# Patient Record
Sex: FEMALE | Race: WHITE | NOT HISPANIC OR LATINO | URBAN - METROPOLITAN AREA
[De-identification: names, ages, dates, MRNs, and addresses within clinical notes are randomized per-mention and may not be internally consistent; named-entity substitution may affect disease eponyms.]

---

## 2017-01-09 ENCOUNTER — HOME CARE/HOSPICE - HEALTHEAST (OUTPATIENT)
Dept: HOME HEALTH SERVICES | Facility: HOME HEALTH | Age: 77
End: 2017-01-09

## 2017-01-12 ENCOUNTER — COMMUNICATION - HEALTHEAST (OUTPATIENT)
Dept: HOME HEALTH SERVICES | Facility: HOME HEALTH | Age: 77
End: 2017-01-12

## 2017-01-12 ENCOUNTER — HOME CARE/HOSPICE - HEALTHEAST (OUTPATIENT)
Dept: HOME HEALTH SERVICES | Facility: HOME HEALTH | Age: 77
End: 2017-01-12

## 2017-01-12 ENCOUNTER — COMMUNICATION - HEALTHEAST (OUTPATIENT)
Dept: FAMILY MEDICINE | Facility: CLINIC | Age: 77
End: 2017-01-12

## 2017-01-23 ENCOUNTER — COMMUNICATION - HEALTHEAST (OUTPATIENT)
Dept: FAMILY MEDICINE | Facility: CLINIC | Age: 77
End: 2017-01-23

## 2017-01-23 ENCOUNTER — HOME CARE/HOSPICE - HEALTHEAST (OUTPATIENT)
Dept: HOME HEALTH SERVICES | Facility: HOME HEALTH | Age: 77
End: 2017-01-23

## 2017-01-23 DIAGNOSIS — E11.9 DM2 (DIABETES MELLITUS, TYPE 2) (H): ICD-10-CM

## 2017-02-01 ENCOUNTER — RECORDS - HEALTHEAST (OUTPATIENT)
Dept: ADMINISTRATIVE | Facility: OTHER | Age: 77
End: 2017-02-01

## 2017-02-06 ENCOUNTER — HOME CARE/HOSPICE - HEALTHEAST (OUTPATIENT)
Dept: HOME HEALTH SERVICES | Facility: HOME HEALTH | Age: 77
End: 2017-02-06

## 2017-02-14 ENCOUNTER — RECORDS - HEALTHEAST (OUTPATIENT)
Dept: ADMINISTRATIVE | Facility: OTHER | Age: 77
End: 2017-02-14

## 2017-02-20 ENCOUNTER — HOME CARE/HOSPICE - HEALTHEAST (OUTPATIENT)
Dept: HOME HEALTH SERVICES | Facility: HOME HEALTH | Age: 77
End: 2017-02-20

## 2017-03-06 ENCOUNTER — HOME CARE/HOSPICE - HEALTHEAST (OUTPATIENT)
Dept: HOME HEALTH SERVICES | Facility: HOME HEALTH | Age: 77
End: 2017-03-06

## 2017-03-06 ENCOUNTER — COMMUNICATION - HEALTHEAST (OUTPATIENT)
Dept: FAMILY MEDICINE | Facility: CLINIC | Age: 77
End: 2017-03-06

## 2017-03-06 DIAGNOSIS — I10 ESSENTIAL HYPERTENSION, MALIGNANT: ICD-10-CM

## 2017-03-09 ENCOUNTER — COMMUNICATION - HEALTHEAST (OUTPATIENT)
Dept: SCHEDULING | Facility: CLINIC | Age: 77
End: 2017-03-09

## 2017-03-09 DIAGNOSIS — E78.00 HYPERCHOLESTEREMIA: ICD-10-CM

## 2017-03-13 ENCOUNTER — COMMUNICATION - HEALTHEAST (OUTPATIENT)
Dept: HOME HEALTH SERVICES | Facility: HOME HEALTH | Age: 77
End: 2017-03-13

## 2017-03-13 ENCOUNTER — HOME CARE/HOSPICE - HEALTHEAST (OUTPATIENT)
Dept: HOME HEALTH SERVICES | Facility: HOME HEALTH | Age: 77
End: 2017-03-13

## 2017-03-20 ENCOUNTER — HOME CARE/HOSPICE - HEALTHEAST (OUTPATIENT)
Dept: HOME HEALTH SERVICES | Facility: HOME HEALTH | Age: 77
End: 2017-03-20

## 2017-03-20 ENCOUNTER — COMMUNICATION - HEALTHEAST (OUTPATIENT)
Dept: FAMILY MEDICINE | Facility: CLINIC | Age: 77
End: 2017-03-20

## 2017-03-20 DIAGNOSIS — E11.9 TYPE II DIABETES MELLITUS (H): ICD-10-CM

## 2017-03-20 DIAGNOSIS — Z00.00 HEALTHCARE MAINTENANCE: ICD-10-CM

## 2017-03-22 ENCOUNTER — OFFICE VISIT - HEALTHEAST (OUTPATIENT)
Dept: FAMILY MEDICINE | Facility: CLINIC | Age: 77
End: 2017-03-22

## 2017-03-22 DIAGNOSIS — N18.30 CHRONIC KIDNEY DISEASE, STAGE III (MODERATE) (H): ICD-10-CM

## 2017-03-22 DIAGNOSIS — E11.9 TYPE II DIABETES MELLITUS (H): ICD-10-CM

## 2017-03-22 DIAGNOSIS — M10.9 GOUT: ICD-10-CM

## 2017-03-22 DIAGNOSIS — F09 COGNITIVE DISORDER: ICD-10-CM

## 2017-03-22 DIAGNOSIS — M47.9 OSTEOARTHRITIS OF SPINE: ICD-10-CM

## 2017-03-22 ASSESSMENT — MIFFLIN-ST. JEOR: SCORE: 1576.98

## 2017-04-01 ENCOUNTER — HOME CARE/HOSPICE - HEALTHEAST (OUTPATIENT)
Dept: HOME HEALTH SERVICES | Facility: HOME HEALTH | Age: 77
End: 2017-04-01

## 2017-04-03 ENCOUNTER — HOME CARE/HOSPICE - HEALTHEAST (OUTPATIENT)
Dept: HOME HEALTH SERVICES | Facility: HOME HEALTH | Age: 77
End: 2017-04-03

## 2017-04-05 ENCOUNTER — COMMUNICATION - HEALTHEAST (OUTPATIENT)
Dept: FAMILY MEDICINE | Facility: CLINIC | Age: 77
End: 2017-04-05

## 2017-04-05 DIAGNOSIS — E11.9 TYPE II DIABETES MELLITUS (H): ICD-10-CM

## 2017-04-05 DIAGNOSIS — M10.9 GOUT: ICD-10-CM

## 2017-04-10 ENCOUNTER — COMMUNICATION - HEALTHEAST (OUTPATIENT)
Dept: SCHEDULING | Facility: CLINIC | Age: 77
End: 2017-04-10

## 2017-04-18 ENCOUNTER — COMMUNICATION - HEALTHEAST (OUTPATIENT)
Dept: SCHEDULING | Facility: CLINIC | Age: 77
End: 2017-04-18

## 2017-04-18 ENCOUNTER — COMMUNICATION - HEALTHEAST (OUTPATIENT)
Dept: FAMILY MEDICINE | Facility: CLINIC | Age: 77
End: 2017-04-18

## 2017-04-24 ENCOUNTER — COMMUNICATION - HEALTHEAST (OUTPATIENT)
Dept: FAMILY MEDICINE | Facility: CLINIC | Age: 77
End: 2017-04-24

## 2017-04-25 ENCOUNTER — COMMUNICATION - HEALTHEAST (OUTPATIENT)
Dept: FAMILY MEDICINE | Facility: CLINIC | Age: 77
End: 2017-04-25

## 2017-04-25 DIAGNOSIS — I10 ESSENTIAL HYPERTENSION, MALIGNANT: ICD-10-CM

## 2017-04-26 ENCOUNTER — OFFICE VISIT - HEALTHEAST (OUTPATIENT)
Dept: FAMILY MEDICINE | Facility: CLINIC | Age: 77
End: 2017-04-26

## 2017-04-26 DIAGNOSIS — N18.30 CHRONIC KIDNEY DISEASE, STAGE III (MODERATE) (H): ICD-10-CM

## 2017-04-26 DIAGNOSIS — R60.0 BILATERAL LEG EDEMA: ICD-10-CM

## 2017-04-26 DIAGNOSIS — J40 BRONCHITIS: ICD-10-CM

## 2017-04-26 ASSESSMENT — MIFFLIN-ST. JEOR: SCORE: 1445.44

## 2017-05-08 ENCOUNTER — COMMUNICATION - HEALTHEAST (OUTPATIENT)
Dept: SCHEDULING | Facility: CLINIC | Age: 77
End: 2017-05-08

## 2017-05-15 ENCOUNTER — OFFICE VISIT - HEALTHEAST (OUTPATIENT)
Dept: FAMILY MEDICINE | Facility: CLINIC | Age: 77
End: 2017-05-15

## 2017-05-15 ENCOUNTER — COMMUNICATION - HEALTHEAST (OUTPATIENT)
Dept: SCHEDULING | Facility: CLINIC | Age: 77
End: 2017-05-15

## 2017-05-15 DIAGNOSIS — I48.91 ATRIAL FIBRILLATION (H): ICD-10-CM

## 2017-05-15 DIAGNOSIS — E11.9 TYPE II DIABETES MELLITUS (H): ICD-10-CM

## 2017-05-15 DIAGNOSIS — R00.1 BRADYCARDIA: ICD-10-CM

## 2017-05-15 LAB
ATRIAL RATE - MUSE: 77 BPM
DIASTOLIC BLOOD PRESSURE - MUSE: NORMAL MMHG
HBA1C MFR BLD: 5.8 % (ref 3.5–6)
INTERPRETATION ECG - MUSE: NORMAL
P AXIS - MUSE: NORMAL DEGREES
PR INTERVAL - MUSE: NORMAL MS
QRS DURATION - MUSE: 128 MS
QT - MUSE: 530 MS
QTC - MUSE: 502 MS
R AXIS - MUSE: 12 DEGREES
SYSTOLIC BLOOD PRESSURE - MUSE: NORMAL MMHG
T AXIS - MUSE: -11 DEGREES
VENTRICULAR RATE- MUSE: 54 BPM

## 2017-05-15 ASSESSMENT — MIFFLIN-ST. JEOR: SCORE: 1485.69

## 2017-05-18 ENCOUNTER — HOME CARE/HOSPICE - HEALTHEAST (OUTPATIENT)
Dept: HOME HEALTH SERVICES | Facility: HOME HEALTH | Age: 77
End: 2017-05-18

## 2017-05-19 ENCOUNTER — OFFICE VISIT - HEALTHEAST (OUTPATIENT)
Dept: OCCUPATIONAL THERAPY | Facility: CLINIC | Age: 77
End: 2017-05-19

## 2017-05-19 DIAGNOSIS — R69 DIAGNOSIS UNKNOWN: ICD-10-CM

## 2017-05-22 ENCOUNTER — COMMUNICATION - HEALTHEAST (OUTPATIENT)
Dept: FAMILY MEDICINE | Facility: CLINIC | Age: 77
End: 2017-05-22

## 2017-05-22 ENCOUNTER — OFFICE VISIT - HEALTHEAST (OUTPATIENT)
Dept: GERIATRICS | Facility: CLINIC | Age: 77
End: 2017-05-22

## 2017-05-22 DIAGNOSIS — N18.9 ACUTE ON CHRONIC KIDNEY FAILURE (H): ICD-10-CM

## 2017-05-22 DIAGNOSIS — E11.9 DM2 (DIABETES MELLITUS, TYPE 2) (H): ICD-10-CM

## 2017-05-22 DIAGNOSIS — R60.0 LOWER EXTREMITY EDEMA: ICD-10-CM

## 2017-05-22 DIAGNOSIS — M10.9 GOUT: ICD-10-CM

## 2017-05-22 DIAGNOSIS — E66.9 OBESITY: ICD-10-CM

## 2017-05-22 DIAGNOSIS — E83.42 HYPOMAGNESEMIA: ICD-10-CM

## 2017-05-22 DIAGNOSIS — N17.9 ACUTE ON CHRONIC KIDNEY FAILURE (H): ICD-10-CM

## 2017-05-23 ENCOUNTER — RECORDS - HEALTHEAST (OUTPATIENT)
Dept: ADMINISTRATIVE | Facility: OTHER | Age: 77
End: 2017-05-23

## 2017-05-26 ENCOUNTER — OFFICE VISIT - HEALTHEAST (OUTPATIENT)
Dept: GERIATRICS | Facility: CLINIC | Age: 77
End: 2017-05-26

## 2017-05-26 DIAGNOSIS — I10 ESSENTIAL HYPERTENSION WITH GOAL BLOOD PRESSURE LESS THAN 140/90: ICD-10-CM

## 2017-05-26 DIAGNOSIS — N18.9 ACUTE ON CHRONIC KIDNEY FAILURE (H): ICD-10-CM

## 2017-05-26 DIAGNOSIS — E11.9 DM2 (DIABETES MELLITUS, TYPE 2) (H): ICD-10-CM

## 2017-05-26 DIAGNOSIS — N17.9 ACUTE ON CHRONIC KIDNEY FAILURE (H): ICD-10-CM

## 2017-06-01 ENCOUNTER — OFFICE VISIT - HEALTHEAST (OUTPATIENT)
Dept: GERIATRICS | Facility: CLINIC | Age: 77
End: 2017-06-01

## 2017-06-01 DIAGNOSIS — N18.9 ACUTE ON CHRONIC KIDNEY FAILURE (H): ICD-10-CM

## 2017-06-01 DIAGNOSIS — K59.00 CONSTIPATION: ICD-10-CM

## 2017-06-01 DIAGNOSIS — N17.9 ACUTE ON CHRONIC KIDNEY FAILURE (H): ICD-10-CM

## 2017-06-01 DIAGNOSIS — E11.9 DM2 (DIABETES MELLITUS, TYPE 2) (H): ICD-10-CM

## 2017-06-01 DIAGNOSIS — E83.42 HYPOMAGNESEMIA: ICD-10-CM

## 2017-06-05 ENCOUNTER — OFFICE VISIT - HEALTHEAST (OUTPATIENT)
Dept: GERIATRICS | Facility: CLINIC | Age: 77
End: 2017-06-05

## 2017-06-05 DIAGNOSIS — N17.9 ACUTE ON CHRONIC KIDNEY FAILURE (H): ICD-10-CM

## 2017-06-05 DIAGNOSIS — N18.9 ACUTE ON CHRONIC KIDNEY FAILURE (H): ICD-10-CM

## 2017-06-05 DIAGNOSIS — I10 ESSENTIAL HYPERTENSION WITH GOAL BLOOD PRESSURE LESS THAN 140/90: ICD-10-CM

## 2017-06-05 DIAGNOSIS — E83.42 HYPOMAGNESEMIA: ICD-10-CM

## 2017-06-05 DIAGNOSIS — R26.81 GAIT INSTABILITY: ICD-10-CM

## 2017-06-07 ENCOUNTER — OFFICE VISIT - HEALTHEAST (OUTPATIENT)
Dept: GERIATRICS | Facility: CLINIC | Age: 77
End: 2017-06-07

## 2017-06-07 DIAGNOSIS — N17.9 ACUTE ON CHRONIC KIDNEY FAILURE (H): ICD-10-CM

## 2017-06-07 DIAGNOSIS — N18.9 ACUTE ON CHRONIC KIDNEY FAILURE (H): ICD-10-CM

## 2017-06-07 DIAGNOSIS — I10 ESSENTIAL HYPERTENSION WITH GOAL BLOOD PRESSURE LESS THAN 140/90: ICD-10-CM

## 2017-06-07 DIAGNOSIS — R26.81 GAIT INSTABILITY: ICD-10-CM

## 2017-06-12 ENCOUNTER — OFFICE VISIT - HEALTHEAST (OUTPATIENT)
Dept: GERIATRICS | Facility: CLINIC | Age: 77
End: 2017-06-12

## 2017-06-12 DIAGNOSIS — I10 ESSENTIAL HYPERTENSION WITH GOAL BLOOD PRESSURE LESS THAN 140/90: ICD-10-CM

## 2017-06-12 DIAGNOSIS — E11.9 DM2 (DIABETES MELLITUS, TYPE 2) (H): ICD-10-CM

## 2017-06-12 DIAGNOSIS — R60.0 LOWER EXTREMITY EDEMA: ICD-10-CM

## 2017-06-12 DIAGNOSIS — N17.9 ACUTE ON CHRONIC KIDNEY FAILURE (H): ICD-10-CM

## 2017-06-12 DIAGNOSIS — N18.9 ACUTE ON CHRONIC KIDNEY FAILURE (H): ICD-10-CM

## 2017-06-12 RX ORDER — GLIPIZIDE 5 MG/1
5 TABLET ORAL DAILY
Status: SHIPPED | COMMUNITY
Start: 2017-06-12

## 2017-06-21 ENCOUNTER — OFFICE VISIT - HEALTHEAST (OUTPATIENT)
Dept: GERIATRICS | Facility: CLINIC | Age: 77
End: 2017-06-21

## 2017-06-21 DIAGNOSIS — Z87.09 HISTORY OF ASTHMA: ICD-10-CM

## 2017-06-21 DIAGNOSIS — R05.9 COUGH: ICD-10-CM

## 2017-06-21 DIAGNOSIS — I10 ESSENTIAL HYPERTENSION WITH GOAL BLOOD PRESSURE LESS THAN 140/90: ICD-10-CM

## 2017-06-21 DIAGNOSIS — N18.9 ACUTE ON CHRONIC KIDNEY FAILURE (H): ICD-10-CM

## 2017-06-21 DIAGNOSIS — N17.9 ACUTE ON CHRONIC KIDNEY FAILURE (H): ICD-10-CM

## 2017-07-13 ENCOUNTER — COMMUNICATION - HEALTHEAST (OUTPATIENT)
Dept: GERIATRICS | Facility: CLINIC | Age: 77
End: 2017-07-13

## 2017-07-13 DIAGNOSIS — E55.9 VITAMIN D DEFICIENCY: ICD-10-CM

## 2017-07-14 ENCOUNTER — COMMUNICATION - HEALTHEAST (OUTPATIENT)
Dept: FAMILY MEDICINE | Facility: CLINIC | Age: 77
End: 2017-07-14

## 2017-07-14 DIAGNOSIS — E55.9 VITAMIN D DEFICIENCY: ICD-10-CM

## 2017-07-17 ENCOUNTER — OFFICE VISIT - HEALTHEAST (OUTPATIENT)
Dept: FAMILY MEDICINE | Facility: CLINIC | Age: 77
End: 2017-07-17

## 2017-07-17 DIAGNOSIS — E11.649 TYPE 2 DIABETES MELLITUS WITH HYPOGLYCEMIA WITHOUT COMA, WITHOUT LONG-TERM CURRENT USE OF INSULIN (H): ICD-10-CM

## 2017-07-17 ASSESSMENT — MIFFLIN-ST. JEOR: SCORE: 1494.21

## 2017-07-27 ENCOUNTER — OFFICE VISIT - HEALTHEAST (OUTPATIENT)
Dept: GERIATRICS | Facility: CLINIC | Age: 77
End: 2017-07-27

## 2017-07-27 DIAGNOSIS — E66.01 MORBID OBESITY, UNSPECIFIED OBESITY TYPE (H): ICD-10-CM

## 2017-07-27 DIAGNOSIS — M54.50 LUMBAGO: ICD-10-CM

## 2017-07-27 DIAGNOSIS — N18.30 CHRONIC KIDNEY DISEASE, STAGE III (MODERATE) (H): ICD-10-CM

## 2017-07-27 DIAGNOSIS — E11.649 TYPE 2 DIABETES MELLITUS WITH HYPOGLYCEMIA WITHOUT COMA, WITHOUT LONG-TERM CURRENT USE OF INSULIN (H): ICD-10-CM

## 2017-08-24 ENCOUNTER — OFFICE VISIT - HEALTHEAST (OUTPATIENT)
Dept: GERIATRICS | Facility: CLINIC | Age: 77
End: 2017-08-24

## 2017-08-24 DIAGNOSIS — E11.649 TYPE 2 DIABETES MELLITUS WITH HYPOGLYCEMIA WITHOUT COMA, WITHOUT LONG-TERM CURRENT USE OF INSULIN (H): ICD-10-CM

## 2017-08-24 DIAGNOSIS — R26.81 GAIT INSTABILITY: ICD-10-CM

## 2017-08-24 DIAGNOSIS — E66.9 OBESITY: ICD-10-CM

## 2017-08-24 DIAGNOSIS — N18.30 CHRONIC KIDNEY DISEASE, STAGE III (MODERATE) (H): ICD-10-CM

## 2017-08-25 ENCOUNTER — AMBULATORY - HEALTHEAST (OUTPATIENT)
Dept: GERIATRICS | Facility: CLINIC | Age: 77
End: 2017-08-25

## 2017-08-25 RX ORDER — FUROSEMIDE 20 MG
20 TABLET ORAL DAILY
Status: SHIPPED | COMMUNITY
Start: 2017-08-25

## 2017-08-25 RX ORDER — FUROSEMIDE 20 MG
20 TABLET ORAL DAILY PRN
Status: SHIPPED | COMMUNITY
Start: 2017-08-25

## 2017-09-21 ENCOUNTER — OFFICE VISIT - HEALTHEAST (OUTPATIENT)
Dept: GERIATRICS | Facility: CLINIC | Age: 77
End: 2017-09-21

## 2017-09-21 DIAGNOSIS — I10 ESSENTIAL HYPERTENSION WITH GOAL BLOOD PRESSURE LESS THAN 140/90: ICD-10-CM

## 2017-09-21 DIAGNOSIS — M54.50 LUMBAGO: ICD-10-CM

## 2017-09-21 DIAGNOSIS — E11.649 TYPE 2 DIABETES MELLITUS WITH HYPOGLYCEMIA WITHOUT COMA, WITHOUT LONG-TERM CURRENT USE OF INSULIN (H): ICD-10-CM

## 2017-09-21 DIAGNOSIS — F09 COGNITIVE DISORDER: ICD-10-CM

## 2017-09-21 RX ORDER — INSULIN GLARGINE 100 [IU]/ML
16 INJECTION, SOLUTION SUBCUTANEOUS AT BEDTIME
Status: SHIPPED | COMMUNITY
Start: 2017-09-21

## 2017-09-21 RX ORDER — LISINOPRIL 10 MG/1
10 TABLET ORAL DAILY
Status: SHIPPED | COMMUNITY
Start: 2017-09-21

## 2017-10-12 ENCOUNTER — OFFICE VISIT - HEALTHEAST (OUTPATIENT)
Dept: GERIATRICS | Facility: CLINIC | Age: 77
End: 2017-10-12

## 2017-10-12 DIAGNOSIS — I10 ESSENTIAL HYPERTENSION WITH GOAL BLOOD PRESSURE LESS THAN 140/90: ICD-10-CM

## 2017-10-12 DIAGNOSIS — R26.81 GAIT INSTABILITY: ICD-10-CM

## 2017-10-12 DIAGNOSIS — E11.649 TYPE 2 DIABETES MELLITUS WITH HYPOGLYCEMIA WITHOUT COMA, WITHOUT LONG-TERM CURRENT USE OF INSULIN (H): ICD-10-CM

## 2017-10-12 DIAGNOSIS — E66.01 MORBID OBESITY, UNSPECIFIED OBESITY TYPE (H): ICD-10-CM

## 2017-10-12 DIAGNOSIS — N18.30 CHRONIC KIDNEY DISEASE, STAGE III (MODERATE) (H): ICD-10-CM

## 2017-11-17 ENCOUNTER — RECORDS - HEALTHEAST (OUTPATIENT)
Dept: LAB | Facility: CLINIC | Age: 77
End: 2017-11-17

## 2017-11-17 LAB
CHOLEST SERPL-MCNC: 133 MG/DL
FASTING STATUS PATIENT QL REPORTED: ABNORMAL
HDLC SERPL-MCNC: 43 MG/DL
LDLC SERPL CALC-MCNC: 67 MG/DL
TRIGL SERPL-MCNC: 114 MG/DL

## 2017-11-20 ENCOUNTER — OFFICE VISIT - HEALTHEAST (OUTPATIENT)
Dept: GERIATRICS | Facility: CLINIC | Age: 77
End: 2017-11-20

## 2017-11-20 DIAGNOSIS — E11.649 TYPE 2 DIABETES MELLITUS WITH HYPOGLYCEMIA WITHOUT COMA, WITHOUT LONG-TERM CURRENT USE OF INSULIN (H): ICD-10-CM

## 2017-11-20 DIAGNOSIS — M54.50 LUMBAGO: ICD-10-CM

## 2017-11-20 DIAGNOSIS — I10 ESSENTIAL HYPERTENSION WITH GOAL BLOOD PRESSURE LESS THAN 140/90: ICD-10-CM

## 2017-11-20 DIAGNOSIS — N18.30 CHRONIC KIDNEY DISEASE, STAGE III (MODERATE) (H): ICD-10-CM

## 2017-11-20 DIAGNOSIS — E66.01 MORBID OBESITY, UNSPECIFIED OBESITY TYPE (H): ICD-10-CM

## 2017-11-20 DIAGNOSIS — E78.00 PURE HYPERCHOLESTEROLEMIA: ICD-10-CM

## 2017-11-30 ENCOUNTER — OFFICE VISIT - HEALTHEAST (OUTPATIENT)
Dept: GERIATRICS | Facility: CLINIC | Age: 77
End: 2017-11-30

## 2017-11-30 DIAGNOSIS — E66.01 MORBID OBESITY, UNSPECIFIED OBESITY TYPE (H): ICD-10-CM

## 2017-11-30 DIAGNOSIS — I10 ESSENTIAL HYPERTENSION WITH GOAL BLOOD PRESSURE LESS THAN 140/90: ICD-10-CM

## 2017-11-30 DIAGNOSIS — N18.30 CHRONIC KIDNEY DISEASE, STAGE III (MODERATE) (H): ICD-10-CM

## 2017-11-30 DIAGNOSIS — R26.81 GAIT INSTABILITY: ICD-10-CM

## 2017-11-30 DIAGNOSIS — E11.649 TYPE 2 DIABETES MELLITUS WITH HYPOGLYCEMIA WITHOUT COMA, WITHOUT LONG-TERM CURRENT USE OF INSULIN (H): ICD-10-CM

## 2017-12-07 ENCOUNTER — AMBULATORY - HEALTHEAST (OUTPATIENT)
Dept: GERIATRICS | Facility: CLINIC | Age: 77
End: 2017-12-07

## 2017-12-18 ENCOUNTER — COMMUNICATION - HEALTHEAST (OUTPATIENT)
Dept: GERIATRICS | Facility: CLINIC | Age: 77
End: 2017-12-18

## 2017-12-27 ENCOUNTER — AMBULATORY - HEALTHEAST (OUTPATIENT)
Dept: FAMILY MEDICINE | Facility: CLINIC | Age: 77
End: 2017-12-27

## 2017-12-27 ENCOUNTER — RECORDS - HEALTHEAST (OUTPATIENT)
Dept: ADMINISTRATIVE | Facility: OTHER | Age: 77
End: 2017-12-27

## 2018-09-06 ENCOUNTER — RECORDS - HEALTHEAST (OUTPATIENT)
Dept: ADMINISTRATIVE | Facility: OTHER | Age: 78
End: 2018-09-06

## 2018-12-28 ENCOUNTER — RECORDS - HEALTHEAST (OUTPATIENT)
Dept: LAB | Facility: CLINIC | Age: 78
End: 2018-12-28

## 2018-12-28 LAB
ANION GAP SERPL CALCULATED.3IONS-SCNC: 12 MMOL/L (ref 5–18)
BUN SERPL-MCNC: 24 MG/DL (ref 8–28)
CALCIUM SERPL-MCNC: 10.6 MG/DL (ref 8.5–10.5)
CHLORIDE BLD-SCNC: 99 MMOL/L (ref 98–107)
CO2 SERPL-SCNC: 27 MMOL/L (ref 22–31)
CREAT SERPL-MCNC: 1.38 MG/DL (ref 0.6–1.1)
ERYTHROCYTE [DISTWIDTH] IN BLOOD BY AUTOMATED COUNT: 15.9 % (ref 11–14.5)
FERRITIN SERPL-MCNC: 27 NG/ML (ref 10–130)
GFR SERPL CREATININE-BSD FRML MDRD: 37 ML/MIN/1.73M2
GLUCOSE BLD-MCNC: 287 MG/DL (ref 70–125)
HBA1C MFR BLD: 10 % (ref 4.2–6.1)
HCT VFR BLD AUTO: 40.3 % (ref 35–47)
HGB BLD-MCNC: 12.6 G/DL (ref 12–16)
IRON SATN MFR SERPL: 16 % (ref 20–50)
IRON SERPL-MCNC: 52 UG/DL (ref 42–175)
MCH RBC QN AUTO: 27.8 PG (ref 27–34)
MCHC RBC AUTO-ENTMCNC: 31.3 G/DL (ref 32–36)
MCV RBC AUTO: 89 FL (ref 80–100)
PLATELET # BLD AUTO: 354 THOU/UL (ref 140–440)
PMV BLD AUTO: 8.9 FL (ref 8.5–12.5)
POTASSIUM BLD-SCNC: 3.9 MMOL/L (ref 3.5–5)
RBC # BLD AUTO: 4.54 MILL/UL (ref 3.8–5.4)
SODIUM SERPL-SCNC: 138 MMOL/L (ref 136–145)
TIBC SERPL-MCNC: 331 UG/DL (ref 313–563)
TRANSFERRIN SERPL-MCNC: 265 MG/DL (ref 212–360)
WBC: 13.2 THOU/UL (ref 4–11)

## 2019-01-25 ENCOUNTER — RECORDS - HEALTHEAST (OUTPATIENT)
Dept: LAB | Facility: CLINIC | Age: 79
End: 2019-01-25

## 2019-01-25 LAB
ALBUMIN UR-MCNC: ABNORMAL MG/DL
APPEARANCE UR: CLEAR
BACTERIA #/AREA URNS HPF: ABNORMAL HPF
BILIRUB UR QL STRIP: NEGATIVE
COLOR UR AUTO: YELLOW
GLUCOSE UR STRIP-MCNC: NEGATIVE MG/DL
HGB UR QL STRIP: NEGATIVE
HYALINE CASTS #/AREA URNS LPF: ABNORMAL LPF
KETONES UR STRIP-MCNC: NEGATIVE MG/DL
LEUKOCYTE ESTERASE UR QL STRIP: NEGATIVE
MUCOUS THREADS #/AREA URNS LPF: ABNORMAL LPF
NITRATE UR QL: NEGATIVE
PH UR STRIP: 6.5 [PH] (ref 4.5–8)
RBC #/AREA URNS AUTO: ABNORMAL HPF
SP GR UR STRIP: 1.02 (ref 1–1.03)
SQUAMOUS #/AREA URNS AUTO: ABNORMAL LPF
UROBILINOGEN UR STRIP-ACNC: ABNORMAL
WBC #/AREA URNS AUTO: ABNORMAL HPF

## 2019-01-27 LAB — BACTERIA SPEC CULT: NORMAL

## 2019-03-07 ENCOUNTER — RECORDS - HEALTHEAST (OUTPATIENT)
Dept: LAB | Facility: CLINIC | Age: 79
End: 2019-03-07

## 2019-03-07 LAB
ANION GAP SERPL CALCULATED.3IONS-SCNC: 10 MMOL/L (ref 5–18)
BUN SERPL-MCNC: 23 MG/DL (ref 8–28)
CALCIUM SERPL-MCNC: 10.1 MG/DL (ref 8.5–10.5)
CHLORIDE BLD-SCNC: 101 MMOL/L (ref 98–107)
CO2 SERPL-SCNC: 26 MMOL/L (ref 22–31)
CREAT SERPL-MCNC: 1.38 MG/DL (ref 0.6–1.1)
GFR SERPL CREATININE-BSD FRML MDRD: 37 ML/MIN/1.73M2
GLUCOSE BLD-MCNC: 394 MG/DL (ref 70–125)
POTASSIUM BLD-SCNC: 4.1 MMOL/L (ref 3.5–5)
SODIUM SERPL-SCNC: 137 MMOL/L (ref 136–145)

## 2019-04-18 ENCOUNTER — RECORDS - HEALTHEAST (OUTPATIENT)
Dept: LAB | Facility: CLINIC | Age: 79
End: 2019-04-18

## 2019-04-18 ENCOUNTER — COMMUNICATION - HEALTHEAST (OUTPATIENT)
Dept: GERIATRICS | Facility: CLINIC | Age: 79
End: 2019-04-18

## 2019-04-18 LAB
ANION GAP SERPL CALCULATED.3IONS-SCNC: 9 MMOL/L (ref 5–18)
BASOPHILS # BLD AUTO: 0 THOU/UL (ref 0–0.2)
BASOPHILS NFR BLD AUTO: 0 % (ref 0–2)
BUN SERPL-MCNC: 27 MG/DL (ref 8–28)
CALCIUM SERPL-MCNC: 10.3 MG/DL (ref 8.5–10.5)
CHLORIDE BLD-SCNC: 105 MMOL/L (ref 98–107)
CO2 SERPL-SCNC: 26 MMOL/L (ref 22–31)
CREAT SERPL-MCNC: 1.08 MG/DL (ref 0.6–1.1)
EOSINOPHIL # BLD AUTO: 0.3 THOU/UL (ref 0–0.4)
EOSINOPHIL NFR BLD AUTO: 3 % (ref 0–6)
ERYTHROCYTE [DISTWIDTH] IN BLOOD BY AUTOMATED COUNT: 16.6 % (ref 11–14.5)
GFR SERPL CREATININE-BSD FRML MDRD: 49 ML/MIN/1.73M2
GLUCOSE BLD-MCNC: 155 MG/DL (ref 70–125)
HCT VFR BLD AUTO: 42.6 % (ref 35–47)
HGB BLD-MCNC: 13.2 G/DL (ref 12–16)
LYMPHOCYTES # BLD AUTO: 4.3 THOU/UL (ref 0.8–4.4)
LYMPHOCYTES NFR BLD AUTO: 37 % (ref 20–40)
MCH RBC QN AUTO: 28.3 PG (ref 27–34)
MCHC RBC AUTO-ENTMCNC: 31 G/DL (ref 32–36)
MCV RBC AUTO: 91 FL (ref 80–100)
MONOCYTES # BLD AUTO: 0.9 THOU/UL (ref 0–0.9)
MONOCYTES NFR BLD AUTO: 8 % (ref 2–10)
NEUTROPHILS # BLD AUTO: 6 THOU/UL (ref 2–7.7)
NEUTROPHILS NFR BLD AUTO: 52 % (ref 50–70)
PLATELET # BLD AUTO: 293 THOU/UL (ref 140–440)
PMV BLD AUTO: 9.7 FL (ref 8.5–12.5)
POTASSIUM BLD-SCNC: 4.2 MMOL/L (ref 3.5–5)
RBC # BLD AUTO: 4.66 MILL/UL (ref 3.8–5.4)
SODIUM SERPL-SCNC: 140 MMOL/L (ref 136–145)
WBC: 11.6 THOU/UL (ref 4–11)

## 2019-04-19 LAB — HBA1C MFR BLD: 11.7 % (ref 4.2–6.1)

## 2019-07-15 ENCOUNTER — RECORDS - HEALTHEAST (OUTPATIENT)
Dept: LAB | Facility: CLINIC | Age: 79
End: 2019-07-15

## 2019-07-15 LAB
ANION GAP SERPL CALCULATED.3IONS-SCNC: 9 MMOL/L (ref 5–18)
BUN SERPL-MCNC: 26 MG/DL (ref 8–28)
CALCIUM SERPL-MCNC: 10.1 MG/DL (ref 8.5–10.5)
CHLORIDE BLD-SCNC: 103 MMOL/L (ref 98–107)
CHOLEST SERPL-MCNC: 117 MG/DL
CO2 SERPL-SCNC: 27 MMOL/L (ref 22–31)
CREAT SERPL-MCNC: 1.72 MG/DL (ref 0.6–1.1)
ERYTHROCYTE [DISTWIDTH] IN BLOOD BY AUTOMATED COUNT: 15.2 % (ref 11–14.5)
FASTING STATUS PATIENT QL REPORTED: ABNORMAL
GFR SERPL CREATININE-BSD FRML MDRD: 29 ML/MIN/1.73M2
GLUCOSE BLD-MCNC: 237 MG/DL (ref 70–125)
HCT VFR BLD AUTO: 41.1 % (ref 35–47)
HDLC SERPL-MCNC: 38 MG/DL
HGB BLD-MCNC: 13 G/DL (ref 12–16)
LDLC SERPL CALC-MCNC: 49 MG/DL
MCH RBC QN AUTO: 29.1 PG (ref 27–34)
MCHC RBC AUTO-ENTMCNC: 31.6 G/DL (ref 32–36)
MCV RBC AUTO: 92 FL (ref 80–100)
PLATELET # BLD AUTO: 344 THOU/UL (ref 140–440)
PMV BLD AUTO: 9.1 FL (ref 8.5–12.5)
POTASSIUM BLD-SCNC: 3.7 MMOL/L (ref 3.5–5)
RBC # BLD AUTO: 4.46 MILL/UL (ref 3.8–5.4)
SODIUM SERPL-SCNC: 139 MMOL/L (ref 136–145)
TRIGL SERPL-MCNC: 150 MG/DL
WBC: 11.8 THOU/UL (ref 4–11)

## 2019-07-16 LAB — HBA1C MFR BLD: 9.3 % (ref 4.2–6.1)

## 2019-07-17 ENCOUNTER — RECORDS - HEALTHEAST (OUTPATIENT)
Dept: LAB | Facility: CLINIC | Age: 79
End: 2019-07-17

## 2019-07-18 LAB — 25(OH)D3 SERPL-MCNC: 41.1 NG/ML (ref 30–80)

## 2019-08-13 ENCOUNTER — RECORDS - HEALTHEAST (OUTPATIENT)
Dept: LAB | Facility: CLINIC | Age: 79
End: 2019-08-13

## 2019-08-13 LAB
ANION GAP SERPL CALCULATED.3IONS-SCNC: 12 MMOL/L (ref 5–18)
BUN SERPL-MCNC: 31 MG/DL (ref 8–28)
CALCIUM SERPL-MCNC: 9.9 MG/DL (ref 8.5–10.5)
CHLORIDE BLD-SCNC: 100 MMOL/L (ref 98–107)
CO2 SERPL-SCNC: 27 MMOL/L (ref 22–31)
CREAT SERPL-MCNC: 1.23 MG/DL (ref 0.6–1.1)
GFR SERPL CREATININE-BSD FRML MDRD: 42 ML/MIN/1.73M2
GLUCOSE BLD-MCNC: 256 MG/DL (ref 70–125)
POTASSIUM BLD-SCNC: 3.5 MMOL/L (ref 3.5–5)
SODIUM SERPL-SCNC: 139 MMOL/L (ref 136–145)

## 2019-12-31 ENCOUNTER — RECORDS - HEALTHEAST (OUTPATIENT)
Dept: LAB | Facility: CLINIC | Age: 79
End: 2019-12-31

## 2019-12-31 LAB
ANION GAP SERPL CALCULATED.3IONS-SCNC: 12 MMOL/L (ref 5–18)
BUN SERPL-MCNC: 22 MG/DL (ref 8–28)
CALCIUM SERPL-MCNC: 9.7 MG/DL (ref 8.5–10.5)
CHLORIDE BLD-SCNC: 101 MMOL/L (ref 98–107)
CO2 SERPL-SCNC: 28 MMOL/L (ref 22–31)
CREAT SERPL-MCNC: 1.24 MG/DL (ref 0.6–1.1)
GFR SERPL CREATININE-BSD FRML MDRD: 42 ML/MIN/1.73M2
GLUCOSE BLD-MCNC: 264 MG/DL (ref 70–125)
POTASSIUM BLD-SCNC: 3.6 MMOL/L (ref 3.5–5)
SODIUM SERPL-SCNC: 141 MMOL/L (ref 136–145)

## 2020-01-01 LAB — HBA1C MFR BLD: 7.7 % (ref 4.2–6.1)

## 2020-02-03 ENCOUNTER — RECORDS - HEALTHEAST (OUTPATIENT)
Dept: LAB | Facility: CLINIC | Age: 80
End: 2020-02-03

## 2020-02-03 LAB
T4 FREE SERPL-MCNC: 1 NG/DL (ref 0.7–1.8)
TSH SERPL DL<=0.005 MIU/L-ACNC: 1.71 UIU/ML (ref 0.3–5)

## 2020-04-14 ENCOUNTER — RECORDS - HEALTHEAST (OUTPATIENT)
Dept: LAB | Facility: CLINIC | Age: 80
End: 2020-04-14

## 2020-04-14 LAB
ANION GAP SERPL CALCULATED.3IONS-SCNC: 11 MMOL/L (ref 5–18)
BUN SERPL-MCNC: 22 MG/DL (ref 8–28)
CALCIUM SERPL-MCNC: 9.7 MG/DL (ref 8.5–10.5)
CHLORIDE BLD-SCNC: 101 MMOL/L (ref 98–107)
CO2 SERPL-SCNC: 26 MMOL/L (ref 22–31)
CREAT SERPL-MCNC: 1.11 MG/DL (ref 0.6–1.1)
ERYTHROCYTE [DISTWIDTH] IN BLOOD BY AUTOMATED COUNT: 14 % (ref 11–14.5)
GFR SERPL CREATININE-BSD FRML MDRD: 48 ML/MIN/1.73M2
GLUCOSE BLD-MCNC: 221 MG/DL (ref 70–125)
HCT VFR BLD AUTO: 40.8 % (ref 35–47)
HGB BLD-MCNC: 12.5 G/DL (ref 12–16)
MCH RBC QN AUTO: 28.5 PG (ref 27–34)
MCHC RBC AUTO-ENTMCNC: 30.6 G/DL (ref 32–36)
MCV RBC AUTO: 93 FL (ref 80–100)
PLATELET # BLD AUTO: 336 THOU/UL (ref 140–440)
PMV BLD AUTO: 9.8 FL (ref 8.5–12.5)
POTASSIUM BLD-SCNC: 3.8 MMOL/L (ref 3.5–5)
RBC # BLD AUTO: 4.38 MILL/UL (ref 3.8–5.4)
SODIUM SERPL-SCNC: 138 MMOL/L (ref 136–145)
WBC: 14.3 THOU/UL (ref 4–11)

## 2020-05-28 ENCOUNTER — RECORDS - HEALTHEAST (OUTPATIENT)
Dept: LAB | Facility: CLINIC | Age: 80
End: 2020-05-28

## 2020-05-28 LAB
ERYTHROCYTE [DISTWIDTH] IN BLOOD BY AUTOMATED COUNT: 15.6 % (ref 11–14.5)
HCT VFR BLD AUTO: 42.6 % (ref 35–47)
HGB BLD-MCNC: 13 G/DL (ref 12–16)
MCH RBC QN AUTO: 28.8 PG (ref 27–34)
MCHC RBC AUTO-ENTMCNC: 30.5 G/DL (ref 32–36)
MCV RBC AUTO: 95 FL (ref 80–100)
PLATELET # BLD AUTO: 359 THOU/UL (ref 140–440)
PMV BLD AUTO: 9.3 FL (ref 8.5–12.5)
RBC # BLD AUTO: 4.51 MILL/UL (ref 3.8–5.4)
WBC: 10 THOU/UL (ref 4–11)

## 2020-06-05 ENCOUNTER — RECORDS - HEALTHEAST (OUTPATIENT)
Dept: LAB | Facility: CLINIC | Age: 80
End: 2020-06-05

## 2020-06-05 LAB
ERYTHROCYTE [DISTWIDTH] IN BLOOD BY AUTOMATED COUNT: 15 % (ref 11–14.5)
HCT VFR BLD AUTO: 40.2 % (ref 35–47)
HGB BLD-MCNC: 12.4 G/DL (ref 12–16)
MCH RBC QN AUTO: 29 PG (ref 27–34)
MCHC RBC AUTO-ENTMCNC: 30.8 G/DL (ref 32–36)
MCV RBC AUTO: 94 FL (ref 80–100)
PLATELET # BLD AUTO: 390 THOU/UL (ref 140–440)
PMV BLD AUTO: 9.4 FL (ref 8.5–12.5)
RBC # BLD AUTO: 4.28 MILL/UL (ref 3.8–5.4)
WBC: 9.9 THOU/UL (ref 4–11)

## 2020-06-17 ENCOUNTER — RECORDS - HEALTHEAST (OUTPATIENT)
Dept: LAB | Facility: CLINIC | Age: 80
End: 2020-06-17

## 2020-06-17 LAB
ALBUMIN UR-MCNC: ABNORMAL MG/DL
APPEARANCE UR: ABNORMAL
BACTERIA #/AREA URNS HPF: ABNORMAL HPF
BILIRUB UR QL STRIP: NEGATIVE
COLOR UR AUTO: ABNORMAL
ERYTHROCYTE [DISTWIDTH] IN BLOOD BY AUTOMATED COUNT: 14.7 % (ref 11–14.5)
GLUCOSE UR STRIP-MCNC: NEGATIVE MG/DL
HCT VFR BLD AUTO: 39.7 % (ref 35–47)
HGB BLD-MCNC: 12 G/DL (ref 12–16)
HGB UR QL STRIP: ABNORMAL
KETONES UR STRIP-MCNC: ABNORMAL MG/DL
LEUKOCYTE ESTERASE UR QL STRIP: ABNORMAL
MCH RBC QN AUTO: 28.2 PG (ref 27–34)
MCHC RBC AUTO-ENTMCNC: 30.2 G/DL (ref 32–36)
MCV RBC AUTO: 93 FL (ref 80–100)
NITRATE UR QL: NEGATIVE
PH UR STRIP: 6 [PH] (ref 4.5–8)
PLATELET # BLD AUTO: 430 THOU/UL (ref 140–440)
PMV BLD AUTO: 9.6 FL (ref 8.5–12.5)
RBC # BLD AUTO: 4.26 MILL/UL (ref 3.8–5.4)
RBC #/AREA URNS AUTO: >100 HPF
SP GR UR STRIP: 1.03 (ref 1–1.03)
SQUAMOUS #/AREA URNS AUTO: ABNORMAL LPF
UROBILINOGEN UR STRIP-ACNC: ABNORMAL
WBC #/AREA URNS AUTO: ABNORMAL HPF
WBC: 12.5 THOU/UL (ref 4–11)

## 2020-06-18 LAB — BACTERIA SPEC CULT: NO GROWTH

## 2020-06-25 ENCOUNTER — RECORDS - HEALTHEAST (OUTPATIENT)
Dept: LAB | Facility: CLINIC | Age: 80
End: 2020-06-25

## 2020-06-26 LAB
BASOPHILS # BLD AUTO: 0.1 THOU/UL (ref 0–0.2)
BASOPHILS NFR BLD AUTO: 1 % (ref 0–2)
EOSINOPHIL # BLD AUTO: 0.3 THOU/UL (ref 0–0.4)
EOSINOPHIL NFR BLD AUTO: 2 % (ref 0–6)
ERYTHROCYTE [DISTWIDTH] IN BLOOD BY AUTOMATED COUNT: 14.8 % (ref 11–14.5)
HCT VFR BLD AUTO: 46 % (ref 35–47)
HGB BLD-MCNC: 14 G/DL (ref 12–16)
LYMPHOCYTES # BLD AUTO: 3 THOU/UL (ref 0.8–4.4)
LYMPHOCYTES NFR BLD AUTO: 24 % (ref 20–40)
MCH RBC QN AUTO: 28.1 PG (ref 27–34)
MCHC RBC AUTO-ENTMCNC: 30.4 G/DL (ref 32–36)
MCV RBC AUTO: 92 FL (ref 80–100)
MONOCYTES # BLD AUTO: 0.7 THOU/UL (ref 0–0.9)
MONOCYTES NFR BLD AUTO: 6 % (ref 2–10)
NEUTROPHILS # BLD AUTO: 8.6 THOU/UL (ref 2–7.7)
NEUTROPHILS NFR BLD AUTO: 68 % (ref 50–70)
PLATELET # BLD AUTO: 406 THOU/UL (ref 140–440)
PMV BLD AUTO: 9.7 FL (ref 8.5–12.5)
RBC # BLD AUTO: 4.98 MILL/UL (ref 3.8–5.4)
WBC: 12.7 THOU/UL (ref 4–11)

## 2020-07-02 ENCOUNTER — RECORDS - HEALTHEAST (OUTPATIENT)
Dept: LAB | Facility: CLINIC | Age: 80
End: 2020-07-02

## 2020-07-02 LAB
ANION GAP SERPL CALCULATED.3IONS-SCNC: 14 MMOL/L (ref 5–18)
BASOPHILS # BLD AUTO: 0 THOU/UL (ref 0–0.2)
BASOPHILS NFR BLD AUTO: 0 % (ref 0–2)
BUN SERPL-MCNC: 16 MG/DL (ref 8–28)
CALCIUM SERPL-MCNC: 10 MG/DL (ref 8.5–10.5)
CHLORIDE BLD-SCNC: 100 MMOL/L (ref 98–107)
CO2 SERPL-SCNC: 23 MMOL/L (ref 22–31)
CREAT SERPL-MCNC: 0.85 MG/DL (ref 0.6–1.1)
EOSINOPHIL # BLD AUTO: 0.2 THOU/UL (ref 0–0.4)
EOSINOPHIL NFR BLD AUTO: 1 % (ref 0–6)
ERYTHROCYTE [DISTWIDTH] IN BLOOD BY AUTOMATED COUNT: 14.8 % (ref 11–14.5)
GFR SERPL CREATININE-BSD FRML MDRD: >60 ML/MIN/1.73M2
GLUCOSE BLD-MCNC: 138 MG/DL (ref 70–125)
HCT VFR BLD AUTO: 46.3 % (ref 35–47)
HGB BLD-MCNC: 14.5 G/DL (ref 12–16)
LYMPHOCYTES # BLD AUTO: 2.9 THOU/UL (ref 0.8–4.4)
LYMPHOCYTES NFR BLD AUTO: 22 % (ref 20–40)
MAGNESIUM SERPL-MCNC: 1.4 MG/DL (ref 1.8–2.6)
MCH RBC QN AUTO: 28.2 PG (ref 27–34)
MCHC RBC AUTO-ENTMCNC: 31.3 G/DL (ref 32–36)
MCV RBC AUTO: 90 FL (ref 80–100)
MONOCYTES # BLD AUTO: 0.8 THOU/UL (ref 0–0.9)
MONOCYTES NFR BLD AUTO: 6 % (ref 2–10)
NEUTROPHILS # BLD AUTO: 9.4 THOU/UL (ref 2–7.7)
NEUTROPHILS NFR BLD AUTO: 71 % (ref 50–70)
PLATELET # BLD AUTO: 416 THOU/UL (ref 140–440)
PMV BLD AUTO: 9.5 FL (ref 8.5–12.5)
POTASSIUM BLD-SCNC: 3.5 MMOL/L (ref 3.5–5)
RBC # BLD AUTO: 5.14 MILL/UL (ref 3.8–5.4)
SODIUM SERPL-SCNC: 137 MMOL/L (ref 136–145)
WBC: 13.4 THOU/UL (ref 4–11)

## 2020-07-10 ENCOUNTER — RECORDS - HEALTHEAST (OUTPATIENT)
Dept: LAB | Facility: CLINIC | Age: 80
End: 2020-07-10

## 2020-07-10 LAB
BASOPHILS # BLD AUTO: 0 THOU/UL (ref 0–0.2)
BASOPHILS NFR BLD AUTO: 0 % (ref 0–2)
EOSINOPHIL # BLD AUTO: 0.2 THOU/UL (ref 0–0.4)
EOSINOPHIL NFR BLD AUTO: 2 % (ref 0–6)
ERYTHROCYTE [DISTWIDTH] IN BLOOD BY AUTOMATED COUNT: 14.6 % (ref 11–14.5)
HCT VFR BLD AUTO: 40.3 % (ref 35–47)
HGB BLD-MCNC: 12.8 G/DL (ref 12–16)
LYMPHOCYTES # BLD AUTO: 3.8 THOU/UL (ref 0.8–4.4)
LYMPHOCYTES NFR BLD AUTO: 27 % (ref 20–40)
MCH RBC QN AUTO: 28.1 PG (ref 27–34)
MCHC RBC AUTO-ENTMCNC: 31.8 G/DL (ref 32–36)
MCV RBC AUTO: 88 FL (ref 80–100)
MONOCYTES # BLD AUTO: 1.1 THOU/UL (ref 0–0.9)
MONOCYTES NFR BLD AUTO: 8 % (ref 2–10)
NEUTROPHILS # BLD AUTO: 8.9 THOU/UL (ref 2–7.7)
NEUTROPHILS NFR BLD AUTO: 63 % (ref 50–70)
PLATELET # BLD AUTO: 409 THOU/UL (ref 140–440)
PMV BLD AUTO: 9 FL (ref 8.5–12.5)
RBC # BLD AUTO: 4.56 MILL/UL (ref 3.8–5.4)
WBC: 14 THOU/UL (ref 4–11)

## 2020-07-14 ENCOUNTER — RECORDS - HEALTHEAST (OUTPATIENT)
Dept: LAB | Facility: CLINIC | Age: 80
End: 2020-07-14

## 2020-07-15 LAB
MAGNESIUM SERPL-MCNC: 1.3 MG/DL (ref 1.8–2.6)
PROCALCITONIN SERPL-MCNC: 0.04 NG/ML (ref 0–0.49)

## 2020-07-21 ENCOUNTER — RECORDS - HEALTHEAST (OUTPATIENT)
Dept: LAB | Facility: CLINIC | Age: 80
End: 2020-07-21

## 2020-07-22 LAB
BASOPHILS # BLD AUTO: 0.1 THOU/UL (ref 0–0.2)
BASOPHILS NFR BLD AUTO: 0 % (ref 0–2)
EOSINOPHIL # BLD AUTO: 0.6 THOU/UL (ref 0–0.4)
EOSINOPHIL NFR BLD AUTO: 6 % (ref 0–6)
ERYTHROCYTE [DISTWIDTH] IN BLOOD BY AUTOMATED COUNT: 15.1 % (ref 11–14.5)
HCT VFR BLD AUTO: 41.3 % (ref 35–47)
HGB BLD-MCNC: 12.6 G/DL (ref 12–16)
LYMPHOCYTES # BLD AUTO: 3.4 THOU/UL (ref 0.8–4.4)
LYMPHOCYTES NFR BLD AUTO: 30 % (ref 20–40)
MCH RBC QN AUTO: 28 PG (ref 27–34)
MCHC RBC AUTO-ENTMCNC: 30.5 G/DL (ref 32–36)
MCV RBC AUTO: 92 FL (ref 80–100)
MONOCYTES # BLD AUTO: 1 THOU/UL (ref 0–0.9)
MONOCYTES NFR BLD AUTO: 8 % (ref 2–10)
NEUTROPHILS # BLD AUTO: 6.3 THOU/UL (ref 2–7.7)
NEUTROPHILS NFR BLD AUTO: 56 % (ref 50–70)
PLATELET # BLD AUTO: 385 THOU/UL (ref 140–440)
PMV BLD AUTO: 9.8 FL (ref 8.5–12.5)
RBC # BLD AUTO: 4.5 MILL/UL (ref 3.8–5.4)
WBC: 11.4 THOU/UL (ref 4–11)

## 2020-11-20 ENCOUNTER — RECORDS - HEALTHEAST (OUTPATIENT)
Dept: LAB | Facility: CLINIC | Age: 80
End: 2020-11-20

## 2020-11-23 LAB
ANION GAP SERPL CALCULATED.3IONS-SCNC: 10 MMOL/L (ref 5–18)
BUN SERPL-MCNC: 19 MG/DL (ref 8–28)
CALCIUM SERPL-MCNC: 9.8 MG/DL (ref 8.5–10.5)
CHLORIDE BLD-SCNC: 104 MMOL/L (ref 98–107)
CO2 SERPL-SCNC: 26 MMOL/L (ref 22–31)
CREAT SERPL-MCNC: 0.82 MG/DL (ref 0.6–1.1)
GFR SERPL CREATININE-BSD FRML MDRD: >60 ML/MIN/1.73M2
GLUCOSE BLD-MCNC: 149 MG/DL (ref 70–125)
HBA1C MFR BLD: 5.8 %
MAGNESIUM SERPL-MCNC: 1.5 MG/DL (ref 1.8–2.6)
POTASSIUM BLD-SCNC: 3.7 MMOL/L (ref 3.5–5)
SODIUM SERPL-SCNC: 140 MMOL/L (ref 136–145)

## 2020-12-04 ENCOUNTER — RECORDS - HEALTHEAST (OUTPATIENT)
Dept: LAB | Facility: CLINIC | Age: 80
End: 2020-12-04

## 2020-12-07 LAB
BASOPHILS # BLD AUTO: 0.1 THOU/UL (ref 0–0.2)
BASOPHILS NFR BLD AUTO: 1 % (ref 0–2)
EOSINOPHIL # BLD AUTO: 0.4 THOU/UL (ref 0–0.4)
EOSINOPHIL NFR BLD AUTO: 4 % (ref 0–6)
ERYTHROCYTE [DISTWIDTH] IN BLOOD BY AUTOMATED COUNT: 14.6 % (ref 11–14.5)
HCT VFR BLD AUTO: 38.3 % (ref 35–47)
HGB BLD-MCNC: 11.5 G/DL (ref 12–16)
IMM GRANULOCYTES # BLD: 0 THOU/UL
IMM GRANULOCYTES NFR BLD: 0 %
LYMPHOCYTES # BLD AUTO: 3.5 THOU/UL (ref 0.8–4.4)
LYMPHOCYTES NFR BLD AUTO: 35 % (ref 20–40)
MAGNESIUM SERPL-MCNC: 1.5 MG/DL (ref 1.8–2.6)
MCH RBC QN AUTO: 27.8 PG (ref 27–34)
MCHC RBC AUTO-ENTMCNC: 30 G/DL (ref 32–36)
MCV RBC AUTO: 93 FL (ref 80–100)
MONOCYTES # BLD AUTO: 0.7 THOU/UL (ref 0–0.9)
MONOCYTES NFR BLD AUTO: 7 % (ref 2–10)
NEUTROPHILS # BLD AUTO: 5.2 THOU/UL (ref 2–7.7)
NEUTROPHILS NFR BLD AUTO: 53 % (ref 50–70)
PLATELET # BLD AUTO: 386 THOU/UL (ref 140–440)
PMV BLD AUTO: 9.5 FL (ref 8.5–12.5)
RBC # BLD AUTO: 4.13 MILL/UL (ref 3.8–5.4)
WBC: 9.9 THOU/UL (ref 4–11)

## 2020-12-08 ENCOUNTER — RECORDS - HEALTHEAST (OUTPATIENT)
Dept: LAB | Facility: CLINIC | Age: 80
End: 2020-12-08

## 2020-12-09 LAB
BASOPHILS # BLD AUTO: 0.1 THOU/UL (ref 0–0.2)
BASOPHILS NFR BLD AUTO: 1 % (ref 0–2)
EOSINOPHIL # BLD AUTO: 0.5 THOU/UL (ref 0–0.4)
EOSINOPHIL NFR BLD AUTO: 4 % (ref 0–6)
ERYTHROCYTE [DISTWIDTH] IN BLOOD BY AUTOMATED COUNT: 14.6 % (ref 11–14.5)
HCT VFR BLD AUTO: 40.1 % (ref 35–47)
HGB BLD-MCNC: 12.6 G/DL (ref 12–16)
IMM GRANULOCYTES # BLD: 0 THOU/UL
IMM GRANULOCYTES NFR BLD: 0 %
LYMPHOCYTES # BLD AUTO: 4.6 THOU/UL (ref 0.8–4.4)
LYMPHOCYTES NFR BLD AUTO: 39 % (ref 20–40)
MCH RBC QN AUTO: 28.3 PG (ref 27–34)
MCHC RBC AUTO-ENTMCNC: 31.4 G/DL (ref 32–36)
MCV RBC AUTO: 90 FL (ref 80–100)
MONOCYTES # BLD AUTO: 0.7 THOU/UL (ref 0–0.9)
MONOCYTES NFR BLD AUTO: 6 % (ref 2–10)
NEUTROPHILS # BLD AUTO: 5.8 THOU/UL (ref 2–7.7)
NEUTROPHILS NFR BLD AUTO: 50 % (ref 50–70)
PLATELET # BLD AUTO: 442 THOU/UL (ref 140–440)
PMV BLD AUTO: 9.1 FL (ref 8.5–12.5)
RBC # BLD AUTO: 4.45 MILL/UL (ref 3.8–5.4)
WBC: 11.8 THOU/UL (ref 4–11)

## 2020-12-15 ENCOUNTER — RECORDS - HEALTHEAST (OUTPATIENT)
Dept: LAB | Facility: CLINIC | Age: 80
End: 2020-12-15

## 2020-12-16 LAB
BASOPHILS # BLD AUTO: 0.1 THOU/UL (ref 0–0.2)
BASOPHILS NFR BLD AUTO: 1 % (ref 0–2)
EOSINOPHIL # BLD AUTO: 0.4 THOU/UL (ref 0–0.4)
EOSINOPHIL NFR BLD AUTO: 5 % (ref 0–6)
ERYTHROCYTE [DISTWIDTH] IN BLOOD BY AUTOMATED COUNT: 14.7 % (ref 11–14.5)
HCT VFR BLD AUTO: 40 % (ref 35–47)
HGB BLD-MCNC: 12.4 G/DL (ref 12–16)
IMM GRANULOCYTES # BLD: 0 THOU/UL
IMM GRANULOCYTES NFR BLD: 0 %
LYMPHOCYTES # BLD AUTO: 3.6 THOU/UL (ref 0.8–4.4)
LYMPHOCYTES NFR BLD AUTO: 41 % (ref 20–40)
MAGNESIUM SERPL-MCNC: 1.5 MG/DL (ref 1.8–2.6)
MCH RBC QN AUTO: 28.2 PG (ref 27–34)
MCHC RBC AUTO-ENTMCNC: 31 G/DL (ref 32–36)
MCV RBC AUTO: 91 FL (ref 80–100)
MONOCYTES # BLD AUTO: 0.7 THOU/UL (ref 0–0.9)
MONOCYTES NFR BLD AUTO: 7 % (ref 2–10)
NEUTROPHILS # BLD AUTO: 4.1 THOU/UL (ref 2–7.7)
NEUTROPHILS NFR BLD AUTO: 46 % (ref 50–70)
PLATELET # BLD AUTO: 415 THOU/UL (ref 140–440)
PMV BLD AUTO: 9.4 FL (ref 8.5–12.5)
RBC # BLD AUTO: 4.4 MILL/UL (ref 3.8–5.4)
WBC: 8.9 THOU/UL (ref 4–11)

## 2020-12-21 ENCOUNTER — RECORDS - HEALTHEAST (OUTPATIENT)
Dept: LAB | Facility: CLINIC | Age: 80
End: 2020-12-21

## 2020-12-23 LAB — MAGNESIUM SERPL-MCNC: 1.5 MG/DL (ref 1.8–2.6)

## 2020-12-26 ENCOUNTER — RECORDS - HEALTHEAST (OUTPATIENT)
Dept: LAB | Facility: CLINIC | Age: 80
End: 2020-12-26

## 2020-12-30 ENCOUNTER — RECORDS - HEALTHEAST (OUTPATIENT)
Dept: LAB | Facility: CLINIC | Age: 80
End: 2020-12-30

## 2020-12-30 LAB — MAGNESIUM SERPL-MCNC: 1.6 MG/DL (ref 1.8–2.6)

## 2020-12-31 LAB
ERYTHROCYTE [DISTWIDTH] IN BLOOD BY AUTOMATED COUNT: 14.7 % (ref 11–14.5)
HCT VFR BLD AUTO: 48.4 % (ref 35–47)
HGB BLD-MCNC: 14.6 G/DL (ref 12–16)
MCH RBC QN AUTO: 28.1 PG (ref 27–34)
MCHC RBC AUTO-ENTMCNC: 30.2 G/DL (ref 32–36)
MCV RBC AUTO: 93 FL (ref 80–100)
PLATELET # BLD AUTO: 229 THOU/UL (ref 140–440)
PMV BLD AUTO: 10.2 FL (ref 8.5–12.5)
RBC # BLD AUTO: 5.19 MILL/UL (ref 3.8–5.4)
WBC: 8.9 THOU/UL (ref 4–11)

## 2021-01-05 ENCOUNTER — RECORDS - HEALTHEAST (OUTPATIENT)
Dept: LAB | Facility: CLINIC | Age: 81
End: 2021-01-05

## 2021-01-06 LAB — MAGNESIUM SERPL-MCNC: 1.7 MG/DL (ref 1.8–2.6)

## 2021-01-18 ENCOUNTER — RECORDS - HEALTHEAST (OUTPATIENT)
Dept: LAB | Facility: CLINIC | Age: 81
End: 2021-01-18

## 2021-01-21 LAB — MAGNESIUM SERPL-MCNC: 1.6 MG/DL (ref 1.8–2.6)

## 2021-01-26 ENCOUNTER — RECORDS - HEALTHEAST (OUTPATIENT)
Dept: LAB | Facility: CLINIC | Age: 81
End: 2021-01-26

## 2021-01-29 LAB — MAGNESIUM SERPL-MCNC: 1.8 MG/DL (ref 1.8–2.6)

## 2021-02-12 ENCOUNTER — RECORDS - HEALTHEAST (OUTPATIENT)
Dept: LAB | Facility: CLINIC | Age: 81
End: 2021-02-12

## 2021-02-15 LAB
CREAT SERPL-MCNC: 0.94 MG/DL (ref 0.6–1.1)
GFR SERPL CREATININE-BSD FRML MDRD: 57 ML/MIN/1.73M2

## 2021-03-19 ENCOUNTER — RECORDS - HEALTHEAST (OUTPATIENT)
Dept: LAB | Facility: CLINIC | Age: 81
End: 2021-03-19

## 2021-03-23 ENCOUNTER — RECORDS - HEALTHEAST (OUTPATIENT)
Dept: LAB | Facility: CLINIC | Age: 81
End: 2021-03-23

## 2021-03-24 LAB
ANION GAP SERPL CALCULATED.3IONS-SCNC: 14 MMOL/L (ref 5–18)
BASOPHILS # BLD AUTO: 0 THOU/UL (ref 0–0.2)
BASOPHILS NFR BLD AUTO: 0 % (ref 0–2)
BUN SERPL-MCNC: 25 MG/DL (ref 8–28)
CALCIUM SERPL-MCNC: 9.7 MG/DL (ref 8.5–10.5)
CHLORIDE BLD-SCNC: 107 MMOL/L (ref 98–107)
CHOLEST SERPL-MCNC: 123 MG/DL
CO2 SERPL-SCNC: 21 MMOL/L (ref 22–31)
CREAT SERPL-MCNC: 0.89 MG/DL (ref 0.6–1.1)
EOSINOPHIL # BLD AUTO: 0.3 THOU/UL (ref 0–0.4)
EOSINOPHIL NFR BLD AUTO: 3 % (ref 0–6)
ERYTHROCYTE [DISTWIDTH] IN BLOOD BY AUTOMATED COUNT: 15.5 % (ref 11–14.5)
FASTING STATUS PATIENT QL REPORTED: ABNORMAL
GFR SERPL CREATININE-BSD FRML MDRD: >60 ML/MIN/1.73M2
GLUCOSE BLD-MCNC: 68 MG/DL (ref 70–125)
HBA1C MFR BLD: 5.6 %
HCT VFR BLD AUTO: 42.8 % (ref 35–47)
HDLC SERPL-MCNC: 36 MG/DL
HGB BLD-MCNC: 13.7 G/DL (ref 12–16)
IMM GRANULOCYTES # BLD: 0 THOU/UL
IMM GRANULOCYTES NFR BLD: 0 %
LDLC SERPL CALC-MCNC: 71 MG/DL
LEVETIRACETAM (KEPPRA): 46 UG/ML (ref 6–46)
LYMPHOCYTES # BLD AUTO: 3.8 THOU/UL (ref 0.8–4.4)
LYMPHOCYTES NFR BLD AUTO: 42 % (ref 20–40)
MCH RBC QN AUTO: 29 PG (ref 27–34)
MCHC RBC AUTO-ENTMCNC: 32 G/DL (ref 32–36)
MCV RBC AUTO: 91 FL (ref 80–100)
MONOCYTES # BLD AUTO: 0.7 THOU/UL (ref 0–0.9)
MONOCYTES NFR BLD AUTO: 8 % (ref 2–10)
NEUTROPHILS # BLD AUTO: 4.4 THOU/UL (ref 2–7.7)
NEUTROPHILS NFR BLD AUTO: 47 % (ref 50–70)
PLATELET # BLD AUTO: 319 THOU/UL (ref 140–440)
PMV BLD AUTO: 10 FL (ref 8.5–12.5)
POTASSIUM BLD-SCNC: 4.5 MMOL/L (ref 3.5–5)
RBC # BLD AUTO: 4.73 MILL/UL (ref 3.8–5.4)
SODIUM SERPL-SCNC: 142 MMOL/L (ref 136–145)
TRIGL SERPL-MCNC: 82 MG/DL
TSH SERPL DL<=0.005 MIU/L-ACNC: 0.31 UIU/ML (ref 0.3–5)
VIT B12 SERPL-MCNC: 407 PG/ML (ref 213–816)
WBC: 9.3 THOU/UL (ref 4–11)

## 2021-03-26 ENCOUNTER — RECORDS - HEALTHEAST (OUTPATIENT)
Dept: LAB | Facility: CLINIC | Age: 81
End: 2021-03-26

## 2021-03-27 LAB — BACTERIA SPEC CULT: NORMAL

## 2021-05-13 ENCOUNTER — RECORDS - HEALTHEAST (OUTPATIENT)
Dept: LAB | Facility: CLINIC | Age: 81
End: 2021-05-13

## 2021-05-14 LAB
CREAT SERPL-MCNC: 0.84 MG/DL (ref 0.6–1.1)
GFR SERPL CREATININE-BSD FRML MDRD: >60 ML/MIN/1.73M2

## 2021-05-25 ENCOUNTER — RECORDS - HEALTHEAST (OUTPATIENT)
Dept: ADMINISTRATIVE | Facility: CLINIC | Age: 81
End: 2021-05-25

## 2021-05-30 VITALS — BODY MASS INDEX: 46.93 KG/M2 | WEIGHT: 255 LBS | HEIGHT: 62 IN

## 2021-05-30 VITALS — WEIGHT: 226 LBS | BODY MASS INDEX: 41.59 KG/M2 | HEIGHT: 62 IN

## 2021-05-30 VITALS — WEIGHT: 235.75 LBS | HEIGHT: 61 IN | BODY MASS INDEX: 44.51 KG/M2

## 2021-05-31 VITALS — HEIGHT: 61 IN | WEIGHT: 238.5 LBS | BODY MASS INDEX: 45.03 KG/M2

## 2021-05-31 VITALS — BODY MASS INDEX: 46.1 KG/M2 | WEIGHT: 244 LBS

## 2021-06-02 ENCOUNTER — RECORDS - HEALTHEAST (OUTPATIENT)
Dept: ADMINISTRATIVE | Facility: CLINIC | Age: 81
End: 2021-06-02

## 2021-06-09 NOTE — PROGRESS NOTES
ASSESMENT AND PLAN:  Diagnoses and all orders for this visit:    Type II diabetes mellitus  Excellent control.  Her last A1c was reviewed today with the patient, it was 5.8.  We reviewed her home blood sugar readings and they are excellent as detailed below.  Given her edema and the excellent control of her blood sugars, we decided to discontinue her pioglitazone.  Because of her underlying cognitive disorder, she requires assistance with setup of her medications which is done via home care.  The patient is not going to pull the medications out of her box herself, she is going to wait for home care visit for the nurse to be able to assist her with this medication change, I did send a message to her home care nurse regarding the medication change.  Also, patient can reduce her home self blood sugar monitoring to once daily instead of twice daily.  Refills on her other medications as ordered below.  Will follow-up here in June, A1c and LDL at that time.  We'll also reevaluate as to whether her edema has improved and there is been any change in her shortness of breath with the discontinuation of the pioglitazone.  We reviewed indications for routine and emergent follow-up.  -     aspirin 81 MG EC tablet; Take 1 tablet (81 mg total) by mouth daily.  Dispense: 90 tablet; Refill: 3  -     metFORMIN (GLUCOPHAGE) 500 MG tablet; Take 2 tablets (1,000 mg total) by mouth 2 (two) times a day.  Dispense: 240 tablet; Refill: 3    Cognitive disorder  Plan as above.    Chronic Kidney Disease, Stage 3  Reviewed her most recent lab results, reviewed the creatinine trend with her which is stable.    Localized Osteoarthritis Of The Vertebral Column  She will use acetaminophen 500 mg every 4-6 hours as needed for exacerbation.    Gout  -     allopurinol (ZYLOPRIM) 300 MG tablet; Take 1 tablet (300 mg total) by mouth daily.  Dispense: 90 tablet; Refill: 3 refills done today.          SUBJECTIVE: 76-year-old female comes in for follow-up  on her diabetes.  She needs refills on some of her medications.  She has noticed some swelling in her feet and ankles on and off over the last few months.  She's also had a couple of episodes of some mild shortness of breath.  No chest pains, no palpitations, no near syncope.  Patient has had excellent control of her diabetes.  She brings in her glucometer log today, she checks her blood sugar twice per day most days.  Lowest reading is in the low 70s, most readings are in the 90s to 110s.  No high readings.  Patient also reports some back pain across her low back.  Mild to moderate in severity, made worse by some movements are positioning, she's had similar pain in the past and has a known past history of osteoarthritis of the spine.  She reports that this does limit her some in her ability to do her exercise which she would like to be able to do for her diabetes and obesity.    No past medical history on file.  Patient Active Problem List   Diagnosis     Vitamin D Deficiency     Muscle Cramps In The Calf     Constipation     Gait - Ataxic     Chronic Gout     Essential Hypertension     Urine Tests Nonspecific Abnormal Findings     Cognitive disorder     Esophageal Reflux     Localized Osteoarthritis Of The Vertebral Column     Tendonitis Of The Right Shoulder     Morbid Obesity     Lower Back Pain     Candidal Intertrigo     Type II diabetes mellitus     Hypercholesterolemia     Chronic Kidney Disease, Stage 3     Hypercalcemia     Hyperlipidemia     Hyperparathyroidism     Serum Enzyme Levels - ALT (SGPT) Elevated     Cataract     Current Outpatient Prescriptions   Medication Sig Dispense Refill     acetaminophen (TYLENOL) 500 MG tablet Take 500 mg by mouth as needed (EVERY 4-6 HOURS. DO NOT EXCEED 4000 MG IN 24 HOURS).       allopurinol (ZYLOPRIM) 300 MG tablet Take 1 tablet (300 mg total) by mouth daily. 90 tablet 3     aspirin 81 MG EC tablet Take 1 tablet (81 mg total) by mouth daily. 90 tablet 3     atenolol  "(TENORMIN) 50 MG tablet TAKE 1 TABLET BY MOUTH EVERY DAY 90 tablet 0     atorvastatin (LIPITOR) 80 MG tablet TAKE 1 TABLET BY MOUTH DAILY AT BEDTIME. 90 tablet 1     CHOLECALCIFEROL 1,000 unit tablet TAKE 1 TABLET BY MOUTH EVERY DAY 90 tablet 2     CONTOUR TEST STRIPS strips USE AS DIRECTED TO TEST TWICE A  strip 6     econazole nitrate (SPECTAZOLE) 1 % cream Apply Twice A Day to Rash       fenofibrate (TRICOR) 48 MG tablet Take 1 tablet (48 mg total) by mouth daily. 90 tablet 2     glipiZIDE (GLUCOTROL) 5 MG 24 hr tablet TAKE 1 TABLET BY MOUTH EVERY DAY IN THE MORNING 90 tablet 3     LANCETS MISC Juni Microlet Lancets Miscellaneous  Use As Directed       lisinopril-hydrochlorothiazide (PRINZIDE,ZESTORETIC) 20-12.5 mg per tablet TAKE 2 TABLETS BY MOUTH EVERY MORNING. 180 tablet 1     melatonin 3 mg Tab tablet Take 1 tablet (3 mg total) by mouth bedtime as needed. 90 tablet 3     metFORMIN (GLUCOPHAGE) 500 MG tablet Take 2 tablets (1,000 mg total) by mouth 2 (two) times a day. 240 tablet 3     mineral oil-hydrophil petrolatum (MINERAL OIL-HYDROPHILIC PETROLATUM) Oint Apply topically 3 (three) times a day.       nystatin (NYSTOP) powder Apply topically 2 (two) times a day as needed (APPLY SPARINGLY TO AFFECTED AREAS TWICE DAILY).       sodium chloride (FOR AMINATA 128) 5 % ophthalmic ointment Administer 1 drop to the right eye bedtime.       No current facility-administered medications for this visit.      History   Smoking Status     Former Smoker   Smokeless Tobacco     Not on file       OBJECTICE:   Visit Vitals     /70 (Patient Site: Right Arm, Patient Position: Sitting, Cuff Size: Adult Large)     Pulse 60     Temp 99.6  F (37.6  C) (Axillary)     Resp 24     Ht 5' 1.5\" (1.562 m)     Wt (!) 255 lb (115.7 kg)     Breastfeeding No     BMI 47.4 kg/m2        No results found for this or any previous visit (from the past 24 hour(s)).     GEN-alert, appropriate, in no apparent distress   CV-regular rate and " rhythm, distant heart tones   RESP-lungs clear to auscultation   EXTREM-warm with 1+ bilateral pitting edema of the feet and ankles   SKIN-no ulcers or vesicles   Musculoskeletal-no midline spinal point tenderness      Jose Angel Mena

## 2021-06-10 NOTE — PROGRESS NOTES
[facility]    Facility:   [facility] [[id number]]   Code Status: FULL CODE      CHIEF COMPLAINT/REASON FOR VISIT:  Chief Complaint   Patient presents with     Review Of Multiple Medical Conditions       HISTORY:      HPI: Josee is a 76 y.o. female seen today in follow-up of her multiple issues.  She was admitted to the hospital because of bradycardia and acute renal failure.  This was secondary to overdiuresis.  Was treated with gentle IV hydration and holding of her metformin and glipizide started on Januvia and resumed low-dose Bumex.  She is doing rather well.  Her creatinine has gone down beautifully from 5.97-2.01.  No bradycardia event since admission to the TCU her edema in the lower legs has not recurred her wounds on the right leg is healing eating okay having good bowel movements no urinary symptoms no chest pains or shortness of breath no CHF symptoms    Past Medical History:   Diagnosis Date     CKD (chronic kidney disease)      DM type 2 (diabetes mellitus, type 2)      GERD (gastroesophageal reflux disease)      Hypercholesteremia      Hypertension      Obesity              No family history on file.  Social History     Social History     Marital status: Single     Spouse name: N/A     Number of children: N/A     Years of education: N/A     Social History Main Topics     Smoking status: Former Smoker     Smokeless tobacco: Not on file     Alcohol use No     Drug use: No     Sexual activity: Not on file     Other Topics Concern     Not on file     Social History Narrative         Review of Systems  As above  .There were no vitals filed for this visit.    Physical Exam  Vital signs stable  Patient is alert, awake, oriented to time, place and person, not in acute cardiorespiratory distress  Skin: Warm, and moist,  no lesions,   Head: atraumatic, normocephalic,   Eyes: EOM's intact and conjugate, pink palpebral conjunctivae, anicteric sclerae, pupils reactive  Lungs :  Clear to auscultation , no crackles, wheezes or rhonchi  Heart : Nornal first and second heart sounds, No murmurs, heaves, or thrills  Abdomen: Soft, non tender, non distended, no hepatosplenomegaly, no ascites  Extremities: Unchanged edema, right leg wound is healing pulses are full and equal      LABS:   No results found for this or any previous visit (from the past 72 hour(s)).  Recent Results (from the past 168 hour(s))   Basic Metabolic Panel   Result Value Ref Range    Sodium 139 136 - 145 mmol/L    Potassium 3.9 3.5 - 5.0 mmol/L    Chloride 105 98 - 107 mmol/L    CO2 25 22 - 31 mmol/L    Anion Gap, Calculation 9 5 - 18 mmol/L    Glucose 131 (H) 70 - 125 mg/dL    Calcium 9.6 8.5 - 10.5 mg/dL    BUN 57 (H) 8 - 28 mg/dL    Creatinine 2.01 (H) 0.60 - 1.10 mg/dL    GFR MDRD Af Amer 29 (L) >60 mL/min/1.73m2    GFR MDRD Non Af Amer 24 (L) >60 mL/min/1.73m2     .    ASSESSMENT:      ICD-10-CM    1. Acute on chronic kidney failure N17.9     N18.9    2. DM2 (diabetes mellitus, type 2) E11.9    3. Essential hypertension with goal blood pressure less than 140/90 I10        PLAN:    Reviewed medications with patient today.  Continue with no adjustments and continue with physical therapy.  Blood sugars are well managed.  Continue Januvia.  No apparent side effects.  Recheck magnesium sometime next week    Total 25 minutes of which 55% was spent counseling and coordination of care of the above plan.    Electronically signed by: Popeye Raman MD

## 2021-06-10 NOTE — PROGRESS NOTES
Chief Complaint   Patient presents with     Low bp,  pulse, and glucose reading today per Home Nurse     Balance unstable       HPI:  Josee Simmons is a 76 y.o. female in for evaluation of bradycardia noted by home health nurse today.  Pulse was 48, BS 57.  Patient denies symptoms at that time.  She notes that she has been a little shortness of breath when trying to walk fast for the last month.  She denies chest pain.  She was lightheaded yesterday when trying to take a shower.  Denies PND. Has nocturia x4 unchanged.    Has had bilateral leg edema which has been worse over about the last 6 weeks.  She states she fell about 2 weeks ago and was seen in the ER.  Since that time she has been walking with two canes because of fear of falling.    She states she has no appetite. Just doesn't feel like eating.  She has been feeling depressed recently.  Doesn't smile, or tease like she used to.    ROS:   Constitutional: no known change in weight  Eyes: occasional blurry vision  ENT: no hearing loss, ear pain, tinnitus or aural discharge. no sore throat, dental pain, hoarseness, dysphagia, oral or tongue lesions.    no nasal stuffiness, discharge, coryza or bleeding. No sinus pain.    Respiratory: no cough otherwise as per HPI   CV: as per HPi  GI: no abdominal or flank pain, anorexia, nausea or vomiting, dysphagia, change in bowel habits or black or bloody stools or weight loss.   : negative   SKIN: negative   MS: bilateral swollen legs  NEURO: No symptoms of neurological impairment or TIA's; no amaurosis, diplopia, dysphasia, or unilateral disturbance of motor or sensory function. No loss of balance or vertigo.   ENDO: No heat or cold intolerance.  No polyuria, polyphagia, polydipsia .  BS running   HEME/LYMPH: No abnormal bruising or abnormal bleeding. No node swelling.      The following portions of the patient's history were reviewed and updated as appropriate: allergies, current medications, past family  "history, past medical history, past social history, past surgical history and problem list.  Please see below.      PE:  Vitals:    05/15/17 1237   BP: 94/56   Patient Site: Left Arm   Patient Position: Sitting   Cuff Size: Adult Large   Pulse: (!) 58   Resp: 20   Temp: 97.9  F (36.6  C)   TempSrc: Oral   SpO2: 98%   Weight: (!) 235 lb 12 oz (106.9 kg)   Height: 5' 1.25\" (1.556 m)       GENERAL:   Alert. Oriented.  EYES: Clear  HENT:  Ears: R TM pearly gray. Normal landmarks. L TM pearly gray.  Normal landmarks  Nose: Clear.  Sinuses: Nontender.  Oropharynx:  No erythema. No exudate.  NECK: Supple. No adenopathy.  LUNGS:  Clear to ascultation.  No crackles. Normal symmetric air entry throughout both lung fields. No chest wall deformities or tenderness.   HEART: S1 and S2 normal, no murmurs, clicks, gallops or rubs. Regular rate and rhythm. . No JVD.   SKIN:  No rash.   ABDOMEN:  +BS. No tenderness. Soft, no guarding, rebound, rigidity,mass, or organomegaly. No CVA tenderness    MS:  Bilateral edema to knees. Weeping lesion on right shin.  NEURO: CN II-XII intact  Motor 4/5 throughout  Sensation intact to light touch  DTR's 2/4 throughout      ORDERS:  Orders Placed This Encounter   Procedures     Comprehensive Metabolic Panel     HM2(CBC w/o Differential)     Glycosylated Hemoglobin A1c     Ambulatory referral to [facility]     Electrocardiogram Perform - Clinic        RESULTS:  Recent Results (from the past 12 hour(s))   HM2(CBC w/o Differential)    Collection Time: 05/15/17  1:35 PM   Result Value Ref Range    WBC 12.4 (H) 4.0 - 11.0 thou/uL    RBC 3.66 (L) 3.80 - 5.40 mill/uL    Hemoglobin 10.3 (L) 12.0 - 16.0 g/dL    Hematocrit 32.4 (L) 35.0 - 47.0 %    MCV 89 80 - 100 fL    MCH 28.0 27.0 - 34.0 pg    MCHC 31.6 (L) 32.0 - 36.0 g/dL    RDW 14.4 11.0 - 14.5 %    Platelets 474 (H) 140 - 440 thou/uL    MPV 7.6 7.0 - 10.0 fL   Glycosylated Hemoglobin A1c    Collection Time: 05/15/17  1:38 PM   Result Value Ref " Range    Hemoglobin A1c 5.8 3.5 - 6.0 %   Electrocardiogram Perform - Clinic    Collection Time: 05/15/17  2:37 PM   Result Value Ref Range    SYSTOLIC BLOOD PRESSURE  mmHg    DIASTOLIC BLOOD PRESSURE  mmHg    VENTRICULAR RATE 54 BPM    ATRIAL RATE 77 BPM    P-R INTERVAL  ms    QRS DURATION 128 ms    Q-T INTERVAL 530 ms    QTC CALCULATION (BEZET) 502 ms    P Axis  degrees    R AXIS 12 degrees    T AXIS -11 degrees    MUSE DIAGNOSIS       Atrial fibrillation with slow ventricular response  Right bundle branch block  Abnormal ECG  When compared with ECG of 18-APR-2017 14:32,  No significant change was found  Confirmed by DOMINGO MERLOS MD LOC: ([id number]) on 5/15/2017 4:41:17 PM       HGB 4/18--10.4; WBC 19.0    IMAGING:  Xr Chest Pa And Lateral    Result Date: 4/18/2017  XR CHEST PA AND LATERAL 4/18/2017 2:44 PM INDICATION: cough, SOB COMPARISON: None. FINDINGS: Cardiomegaly. Small amount of discoid atelectasis on the right. Some blunting of the right costophrenic angle may also be some atelectasis. Pleural fluid is not seen in lateral view. Prominent pulmonary vasculature.    Ct Head Without Contrast    Result Date: 4/29/2017  [facility] CT HEAD WO CONTRAST 4/29/2017 7:32 PM INDICATION: trauma fall TECHNIQUE: Routine. Dose reduction techniques were used. CONTRAST: None. COMPARISON:  None. FINDINGS: No intracranial hemorrhage, extraaxial collection, mass effect or CT evidence of acute infarct. There is scattered diffuse low attenuation noted within the periventricular and subcortical white matter consistent with moderate diffuse small vessel ischemic disease.The ventricular system, basal cisterns and the cortical sulci are consistent with volume loss and the ventricles appear out of portion to the sulci suggestive of normal pressure hydrocephalus. The calvarium is intact. There is a scalp hematoma in the anterior left frontal region. There is vascular calcification noted involving the cavernous portions of  the internal carotid arteries. The mastoid air cells and the middle ear regions are clear. There is complete opacification of the left maxillary sinus with calcification noted centrally and extending into the nasal cavity. This is a chronic process with thickening and sclerosis of its walls.     CONCLUSION: 1.  Ventricular system consistent with diffuse volume loss and a possible component of normal pressure hydrocephalus. 2.  No discrete mass lesion, hemorrhage or focal area suggestive of acute edema by CT at this time. 3.  Diffuse small vessel ischemic disease of the periventricular and subcortical white matter: 4.  There is chronic opacification of the left maxillary sinus. This could represent desiccated secretions or possible fungal sinusitis. Recommend clinical correlation.     EKG:  DATE 5/15/17  Rate: 54  Rhythm: a fib. RBBB  Axis: normal  ST Segments:  No eleation or depression  Impression:  A. Fib with RBBB and bradycardia.  Essentially unchanged from 4/16/18  I have personally read the EKG.     CHART REVIEW  DATE/place of visit: [facility]; 4/29/17  DX: fall, head trauma  TESTS: Head CT--see above  TREATMENT: none          A/P:    1. Bradycardia  Electrocardiogram Perform - Clinic    Comprehensive Metabolic Panel    HM2(CBC w/o Differential)    Ambulatory referral to [facility]   2. Type II diabetes mellitus  Glycosylated Hemoglobin A1c   3. Atrial fibrillation  Ambulatory referral to [facility]       Bradycardia with a. Fib and RBBB that was noted on EKG on ER visit of 4/16/17.  She had an episode of lightheadedness yesterday, but was shortlived and is asymptomatic cardiac and pulmonary wise now.  She has had increased lower extremity edema over the last 6 weeks and note that her weight is up.  Also she c/o of some increased shortness of breath with exertion so may have some subacute CHF.  She denies symptoms of acute CHF on history.  She has noted increased weakness recently and she has  had some trouble with walking to the point of using two canes.  Will stop her atenolol today.  Continue all her other medications.  She will be seen in the rapid access cardiac clinic tomorrow.  She has an appointment with her primary doctor in two days to address her other issues.        PMH:  Patient Active Problem List   Diagnosis     Vitamin D Deficiency     Muscle Cramps In The Calf     Constipation     Gait - Ataxic     Chronic Gout     Essential Hypertension     Urine Tests Nonspecific Abnormal Findings     Cognitive disorder     Esophageal Reflux     Localized Osteoarthritis Of The Vertebral Column     Tendonitis Of The Right Shoulder     Morbid Obesity     Lower Back Pain     Candidal Intertrigo     Type II diabetes mellitus     Hypercholesterolemia     Chronic Kidney Disease, Stage 3     Hypercalcemia     Hyperlipidemia     Hyperparathyroidism     Serum Enzyme Levels - ALT (SGPT) Elevated     Cataract        MEDICATIONS:  Outpatient Medications Prior to Visit   Medication Sig Dispense Refill     allopurinol (ZYLOPRIM) 300 MG tablet Take 300 mg by mouth daily with supper.       aspirin 81 MG EC tablet Take 81 mg by mouth daily with supper.       atenolol (TENORMIN) 50 MG tablet Take 25 mg by mouth daily.       atorvastatin (LIPITOR) 80 MG tablet Take 80 mg by mouth at bedtime.       cholecalciferol, vitamin D3, 1,000 unit tablet Take 1,000 Units by mouth daily.       CONTOUR TEST STRIPS strips USE AS DIRECTED TO TEST TWICE A  strip 6     fenofibrate (TRICOR) 48 MG tablet Take 48 mg by mouth at bedtime.       furosemide (LASIX) 20 MG tablet Take 1 tablet (20 mg total) by mouth daily. 30 tablet 1     glipiZIDE (GLUCOTROL) 5 MG 24 hr tablet Take 5 mg by mouth daily with breakfast.       LANCETS MISC Juni Microlet Lancets Miscellaneous  Use As Directed       lisinopril-hydrochlorothiazide (PRINZIDE,ZESTORETIC) 20-12.5 mg per tablet TAKE 2 TABLETS BY MOUTH EVERY MORNING. 180 tablet 1     metFORMIN  (GLUCOPHAGE) 500 MG tablet Take 1,000 mg by mouth 2 (two) times a day with meals.       lisinopril-hydrochlorothiazide (PRINZIDE,ZESTORETIC) 20-12.5 mg per tablet Take 2 tablets by mouth every morning.       acetaminophen (TYLENOL) 500 MG tablet Take 500 mg by mouth every 4 (four) hours as needed. Every 4-6 hours as needed. DO NOT EXCEED 4000 MG IN 24 HOURS.       melatonin 3 mg Tab tablet Take 1 tablet (3 mg total) by mouth bedtime as needed. 90 tablet 3     mineral oil-hydrophil petrolatum (MINERAL OIL-HYDROPHILIC PETROLATUM) Oint Apply topically 3 (three) times a day as needed.        nystatin (NYSTOP) powder Apply topically 2 (two) times a day as needed. APPLY SPARINGLY TO AFFECTED AREAS TWICE DAILY       No facility-administered medications prior to visit.         ALLERGIES:  Allergies as of 05/15/2017 - Marko as Reviewed 05/15/2017   Allergen Reaction Noted     No known drug allergies  05/15/2017        SOCIAL:  Social History   Substance Use Topics     Smoking status: Former Smoker     Smokeless tobacco: Not on file     Alcohol use No            Dorys Reis MD     5/15/2017

## 2021-06-10 NOTE — PROGRESS NOTES
[facility]      Facility:    [facility] [[id number]]  Code Status: FULL CODE      Chief Complaint/Reason for Visit:  Chief Complaint   Patient presents with     H & P       HPI:   Josee is a 76 y.o. female who was admitted to [facility] on May 16 and transferred to this facility on May 20, 2017.  She is a patient of the [facility].  She has a medical history significant for type 2 diabetes on glipizide and metformin, hypertension, gout, cognitive disorder, obesity, GERD, CKD 3 with baseline of 1.63 on diuretics and opacity.  She used to live in an apartment and walks around with the use of a cane she has 2 kids ages 41 and 40 with no grandkids.  She presented to her primary care physician with bradycardia.  She fell 2 weeks prior to admission to the hospital.  It is unclear what symptoms brought her in to her primary care physician's office.  At any rate with a bradycardia, his beta-blocker atenolol was stopped and blood work was done.  He was also hypotensive at that time.  Blood work came back abnormal and so she was advised to get into the hospital for further evaluation.    On further questioning, she and her doctor had been working to get her lose some weight and water retention for the last 2 months.  Her weight was 255 pounds in March and she was diuresed quite effectively down to 226 pounds and then to 235 pounds.    It was felt that the diuresis had caused her acute bump in his creatinine which was up to 5.97 on presentation.  She was also noted to be severely hypoglycemic with  Sugar of 21 on presentation.    In the days leading to her presentation, she also noted worsening of her bilateral leg edema.  Further evaluation included a CT scan of the head as well as chest x-ray.  No infectious etiology was found.  CT scan of the head did not reveal any acute changes.  She was seen by nephrology who recommended to continue holding her ACE inhibitor  hydrochlorothiazide and Lasix.  She was placed on IV fluid hydration.  Her oral antidiabetic medications were held metformin and glipizide and this was replaced with Januvia 25mg.  Her creatinine eventually improved from 5.97-4.76 and down to 2.17.  Initial evaluation also revealed leukocytosis with WBC count of 12.4 no infectious etiology.    Magnesium levels were also normal and this was replaced.  At the time of discharge she was safely placed back on furosemide 20 mg once a day.    She has developed blisters on the right shin because of increasing edema these have all busted and is healing and is drying up.    Her blood pressure has improved she denies any dizziness or syncopal episode no palpitations.  She is urinating a lot she is also drinking a lot  Denies any urinary hesitancy or dysuria or low back pain  Blood sugars are much better however they are trending towards the higher 100s.  Nursing staff has also noted an open area in the buttock a stage II on admission.    Denies any recent flare of her gout.    Past Medical History:  Past Medical History:   Diagnosis Date     CKD (chronic kidney disease)      DM type 2 (diabetes mellitus, type 2)      GERD (gastroesophageal reflux disease)      Hypercholesteremia      Hypertension      Obesity            Surgical History:  Past Surgical History:   Procedure Laterality Date     EYE SURGERY      bilat     NH EXPLORE PARATHYROID+MEDIASTINUM      Description: Parathyroid Sub-Total Parathyroidect W/ Medias Exploration;  Proc Date: 07/27/2010;  Comments: Dr Jose Angel Alonzo     NH REVISE MEDIAN N/CARPAL TUNNEL SURG      Description: Neuroplasty Decompression Median Nerve At Carpal Tunnel;  Recorded: 12/13/2007;       Family History:   No family history on file.    Social History:    Social History     Social History     Marital status: Single     Spouse name: N/A     Number of children: N/A     Years of education: N/A     Social History Main Topics     Smoking status:  Former Smoker     Smokeless tobacco: Not on file     Alcohol use No     Drug use: No     Sexual activity: Not on file     Other Topics Concern     Not on file     Social History Narrative          Review of Systems  Unremarkable except for those noted above  There were no vitals filed for this visit.    Physical Exam  Vital signs noted  Patient is alert, awake, oriented to time, place and person, not in acute cardiorespiratory distress  Skin: Warm, and moist,  no lesions,   Head: atraumatic, normocephalic,   Eyes: EOM's intact and conjugate, pink palpebral conjunctivae, anicteric sclerae, pupils reactive  Lungs : Clear to auscultation , no crackles, wheezes or rhonchi  Heart : Nornal first and second heart sounds, No murmurs, heaves, or thrills  Abdomen: Soft, non tender, non distended, no hepatosplenomegaly, no ascites  Extremities: bipedal edema, (+) healing crusted lessions, 3 in number on R shin pulses are full and equal      Medication List:  Current Outpatient Prescriptions   Medication Sig     acetaminophen (TYLENOL) 500 MG tablet Take 500 mg by mouth every 4 (four) hours as needed. Every 4-6 hours as needed. DO NOT EXCEED 4000 MG IN 24 HOURS.     allopurinol (ZYLOPRIM) 300 MG tablet Take 300 mg by mouth daily with supper.     aspirin 81 MG EC tablet Take 81 mg by mouth daily with supper.     atorvastatin (LIPITOR) 80 MG tablet Take 80 mg by mouth at bedtime.     cholecalciferol, vitamin D3, 1,000 unit tablet Take 1,000 Units by mouth daily.     CONTOUR TEST STRIPS strips USE AS DIRECTED TO TEST TWICE A DAY     fenofibrate (TRICOR) 48 MG tablet Take 48 mg by mouth at bedtime.     furosemide (LASIX) 20 MG tablet Take 1 tablet (20 mg total) by mouth daily.     LANCETS MISC Juni Microlet Lancets Miscellaneous  Use As Directed     magnesium oxide (MAG-OX) 400 mg tablet Take 1 tablet (400 mg total) by mouth 2 (two) times a day for 4 days.     melatonin 3 mg Tab tablet Take 1 tablet (3 mg total) by mouth bedtime  as needed.     mineral oil-hydrophil petrolatum (MINERAL OIL-HYDROPHILIC PETROLATUM) Oint Apply topically 3 (three) times a day as needed.      NOVOLOG 100 unit/mL injection Check blood sugar three (3) times daily.  11.9 Type 2 without complications  Microlet Color Lancets (100s) - dispense 1, refill PRN for 1 year  Flex Pen Needles - BD Ultra-fine Dona Pen Needles - NDC [address]-15 - dispense 1 case, refill PRN for 1 year     sitaGLIPtin (JANUVIA) 25 MG tablet Take 1 tablet (25 mg total) by mouth daily.       Labs:  Recent Results (from the past 72 hour(s))   POCT Glucose   Result Value Ref Range    Glucose,  mg/dL   POCT Glucose   Result Value Ref Range    Glucose,  mg/dL   Renal Function Profile   Result Value Ref Range    Albumin 2.7 (L) 3.5 - 5.0 g/dL    Calcium 9.4 8.5 - 10.5 mg/dL    Phosphorus 3.3 2.5 - 4.5 mg/dL    Glucose 121 70 - 125 mg/dL    BUN 70 (H) 8 - 28 mg/dL    Creatinine 2.17 (H) 0.60 - 1.10 mg/dL    Sodium 138 136 - 145 mmol/L    Potassium 3.9 3.5 - 5.0 mmol/L    Chloride 105 98 - 107 mmol/L    CO2 25 22 - 31 mmol/L    Anion Gap, Calculation 8 5 - 18 mmol/L    GFR MDRD Af Amer 27 (L) >60 mL/min/1.73m2    GFR MDRD Non Af Amer 22 (L) >60 mL/min/1.73m2   Magnesium   Result Value Ref Range    Magnesium 1.5 (L) 1.8 - 2.6 mg/dL   Platelet Count - every other day x 3   Result Value Ref Range    Platelets 383 140 - 440 thou/uL   POCT Glucose   Result Value Ref Range    Glucose,  mg/dL   POCT Glucose   Result Value Ref Range    Glucose,  mg/dL   POCT Glucose   Result Value Ref Range    Glucose,  mg/dL   Basic Metabolic Panel   Result Value Ref Range    Sodium 139 136 - 145 mmol/L    Potassium 3.9 3.5 - 5.0 mmol/L    Chloride 105 98 - 107 mmol/L    CO2 25 22 - 31 mmol/L    Anion Gap, Calculation 9 5 - 18 mmol/L    Glucose 131 (H) 70 - 125 mg/dL    Calcium 9.6 8.5 - 10.5 mg/dL    BUN 57 (H) 8 - 28 mg/dL    Creatinine 2.01 (H) 0.60 - 1.10 mg/dL    GFR MDRD Af Amer 29 (L)  >60 mL/min/1.73m2    GFR MDRD Non Af Amer 24 (L) >60 mL/min/1.73m2         Assessment:    ICD-10-CM    1. Acute on chronic kidney failure N17.9     N18.9    2. Lower extremity edema R60.0    3. DM2 (diabetes mellitus, type 2) E11.9    4. Hypomagnesemia E83.42    5. Gout M10.9    6. Obesity E66.9        Plan:  Kidney function continues to improve.  Now creatinine down to just 2.01.  She had a lot of questions as to whether her kidney function could go back to her previous baseline.  They do have a lot of hope that this will happen.  Continue to hold glipizide and metformin.  In fact these 2 medications have been discontinued and she was started on Januvia.  Watch blood sugars closely.  Consider increasing if blood sugars are uncontrolled.  She has been restarted on furosemide for edema control.  Blood pressures manageable without ACE inhibitor as soon as kidney function goes back to baseline it will be safe to put her back on her ACE inhibitor.  Continue magnesium supplementation.  Standard procedure wound cares for right shin wounds  Continue allopurinol for gout prophylaxis    Total time spent was 60 minutes with more than 50% spent on counseling, discussion of treatment plan and extensive review of available records  This note has been dictated using voice recognition software. Any grammatical, typographical, or context distortions are unintentional.          Electronically signed by: Popeye Raman MD

## 2021-06-10 NOTE — PROGRESS NOTES
ASSESMENT AND PLAN:  Diagnoses and all orders for this visit:    Bilateral leg edema  Written prescription for medium strength knee-high compression stockings given.  Demented elevation.  -     furosemide (LASIX) 20 MG tablet; Take 1 tablet (20 mg total) by mouth daily.  Dispense: 30 tablet; Refill: 1  Reviewed most recent creatinine and potassium, will plan on rechecking a basic metabolic panel in 3 weeks at a follow-up visit here in the clinic.  Consider echo if not improving.    Chronic Kidney Disease, Stage 3  Plan as above.      Bronchitis  Reviewed the data from the recent emergency room evaluation which included a negative chest x-ray and an elevated white blood count.  She completed her course of antibiotics.  Symptoms improving as detailed below, we reviewed indications for follow-up.        SUBJECTIVE: 76-year-old female comes in for follow-up on her emergency room visit.  She had gone in with cough and chest pain.  Chest x-ray ended up being negative.  She was treated with azithromycin for bronchitis.  She completed her course of antibiotics and is feeling much better.  She reports her chest pain has resolved completely.  Her shortness of breath has improved dramatically, currently not short of breath at rest but notices a little shortness of breath with exertion.  She has also noticed that her feet and ankles and lower legs continue to be swollen on both sides.  Patient reports that this is caused both of her legs to feel mildly painful and tight.  She thinks this started about 6 weeks ago.  No fevers or chills or vomiting.  Patient lives in a senior living situation and has significant transportation limitations and barriers.  Uses [employer] for most of her transportation but does have a friend that can also help her.    Past Medical History:   Diagnosis Date     CKD (chronic kidney disease)      DM type 2 (diabetes mellitus, type 2)      GERD (gastroesophageal reflux disease)       Hypercholesteremia      Hypertension      Obesity      Patient Active Problem List   Diagnosis     Vitamin D Deficiency     Muscle Cramps In The Calf     Constipation     Gait - Ataxic     Chronic Gout     Essential Hypertension     Urine Tests Nonspecific Abnormal Findings     Cognitive disorder     Esophageal Reflux     Localized Osteoarthritis Of The Vertebral Column     Tendonitis Of The Right Shoulder     Morbid Obesity     Lower Back Pain     Candidal Intertrigo     Type II diabetes mellitus     Hypercholesterolemia     Chronic Kidney Disease, Stage 3     Hypercalcemia     Hyperlipidemia     Hyperparathyroidism     Serum Enzyme Levels - ALT (SGPT) Elevated     Cataract     Current Outpatient Prescriptions   Medication Sig Dispense Refill     acetaminophen (TYLENOL) 500 MG tablet Take 500 mg by mouth every 4 (four) hours as needed. Every 4-6 hours as needed. DO NOT EXCEED 4000 MG IN 24 HOURS.       allopurinol (ZYLOPRIM) 300 MG tablet Take 300 mg by mouth daily with supper.       aspirin 81 MG EC tablet Take 81 mg by mouth daily with supper.       atenolol (TENORMIN) 50 MG tablet Take 25 mg by mouth daily.       atorvastatin (LIPITOR) 80 MG tablet Take 80 mg by mouth at bedtime.       cholecalciferol, vitamin D3, 1,000 unit tablet Take 1,000 Units by mouth daily.       CONTOUR TEST STRIPS strips USE AS DIRECTED TO TEST TWICE A  strip 6     fenofibrate (TRICOR) 48 MG tablet Take 48 mg by mouth at bedtime.       glipiZIDE (GLUCOTROL) 5 MG 24 hr tablet Take 5 mg by mouth daily with breakfast.       LANCETS MISC Juni Microlet Lancets Miscellaneous  Use As Directed       lisinopril-hydrochlorothiazide (PRINZIDE,ZESTORETIC) 20-12.5 mg per tablet Take 2 tablets by mouth every morning.       melatonin 3 mg Tab tablet Take 1 tablet (3 mg total) by mouth bedtime as needed. 90 tablet 3     metFORMIN (GLUCOPHAGE) 500 MG tablet Take 1,000 mg by mouth 2 (two) times a day with meals.       mineral oil-hydrophil  "petrolatum (MINERAL OIL-HYDROPHILIC PETROLATUM) Oint Apply topically 3 (three) times a day as needed.        nystatin (NYSTOP) powder Apply topically 2 (two) times a day as needed. APPLY SPARINGLY TO AFFECTED AREAS TWICE DAILY       furosemide (LASIX) 20 MG tablet Take 1 tablet (20 mg total) by mouth daily. 30 tablet 1     No current facility-administered medications for this visit.      History   Smoking Status     Former Smoker   Smokeless Tobacco     Not on file       OBJECTICE: /54 (Patient Site: Right Arm, Patient Position: Sitting, Cuff Size: Adult Large)  Pulse 60  Temp 97.9  F (36.6  C) (Oral)   Resp 20  Ht 5' 1.5\" (1.562 m)  Wt (!) 226 lb (102.5 kg)  SpO2 97%  Breastfeeding? No  BMI 42.01 kg/m2     No results found for this or any previous visit (from the past 24 hour(s)).     GEN-alert, appropriate, in no apparent distress   CV-Regular rate and rhythm with no murmur   RESP-lungs clear to auscultation   ABDOMINAL-soft and nontender   EXTREM- 2 plus edema bilaterally   SKIN-shallow ulcerations without surrounding evidence of infection on the lower legs right side worse than left.      Jose Angel Mena          "

## 2021-06-11 NOTE — PROGRESS NOTES
[facility]    Facility:   [facility] [[id number]]   Code Status: FULL CODE      CHIEF COMPLAINT/REASON FOR VISIT:  Chief Complaint   Patient presents with     Review Of Multiple Medical Conditions       HISTORY:      HPI: Josee is a 76 y.o. female was seen today in follow-up.  She is doing really well with therapy.  Going forward she will be needing a front-wheeled walker.  Blood sugars are still very high in the 200s.  Her metformin and glipizide has been held in the hospital because of acute kidney issue/failure.  It is starting to recover now.  Her weight has been down she is 236 pounds today    Denies any shortness of breath or chest discomfort or pressure.  Denies any headache or dizziness.  Does have a good bowel movement as long as she is taking prunes she urinates all the time because of her diuretic    Past Medical History:   Diagnosis Date     CKD (chronic kidney disease)      DM type 2 (diabetes mellitus, type 2)      GERD (gastroesophageal reflux disease)      Hypercholesteremia      Hypertension      Obesity              No family history on file.  Social History     Social History     Marital status: Single     Spouse name: N/A     Number of children: N/A     Years of education: N/A     Social History Main Topics     Smoking status: Former Smoker     Smokeless tobacco: Not on file     Alcohol use No     Drug use: No     Sexual activity: Not on file     Other Topics Concern     Not on file     Social History Narrative         Review of Systems  Unremarkable  .There were no vitals filed for this visit.    Physical Exam  Vital signs noted.  Weight 236 pounds  Patient is alert, awake, oriented to time, place and person, not in acute cardiorespiratory distress  Skin: Warm, and moist,  no lesions,   Head: atraumatic, normocephalic,   Eyes: EOM's intact and conjugate, pink palpebral conjunctivae, anicteric sclerae, pupils reactive  Lungs : Clear to auscultation , no  crackles, wheezes or rhonchi  Heart : Nornal first and second heart sounds, No murmurs, heaves, or thrills  Abdomen: Soft, non tender, non distended, no hepatosplenomegaly, no ascites  Extremities: Still with significant bipedal edema, pulses are full and equal      LABS:   No results found for this or any previous visit (from the past 72 hour(s)).      ASSESSMENT:      ICD-10-CM    1. Uncontrolled diabetes mellitus E11.65    2. Gait instability R26.81    3. Acute on chronic kidney failure N17.9     N18.9    4. Essential hypertension with goal blood pressure less than 140/90 I10        PLAN:    Increase glipizide further to 5 mg daily continue with Januvia 50 mg  Increase furosemide to 40 mg from 20 mg  Watch kidney function closely.  Magnesium levels have improved  Looking into assisted living places will move up to third floor in the meantime    Total 25 minutes of which 55% was spent counseling and coordination of care of the above plan.    Electronically signed by: Popeye Raman MD

## 2021-06-11 NOTE — PROGRESS NOTES
[facility]      Facility:    [facility] [[id number]]  Code Status: FULL CODE      Chief Complaint/Reason for Visit:  Chief Complaint   Patient presents with     H & P       HPI:   Josee is a 76 y.o. female whowas admitted to [facility] on May 16 and transferred to this facility on May 20, 2017.  She is a patient of the [facility].  She has a medical history significant for type 2 diabetes on glipizide and metformin, hypertension, gout, cognitive disorder, obesity, GERD, CKD 3 with baseline of 1.63 on diuretics and obecity.  She used to live in an apartment and walks around with the use of a cane she has 2 kids ages 41 and 40 with no grandkids.  She presented to her primary care physician with bradycardia.  She fell 2 weeks prior to admission to the hospital.  It is unclear what symptoms brought her in to her primary care physician's office.  At any rate with a bradycardia, his beta-blocker atenolol was stopped and blood work was done.  He was also hypotensive at that time.  Blood work came back abnormal and so she was advised to get into the hospital for further evaluation.      On further questioning, she and her doctor had been working to get her lose some weight and water retention for the last 2 months.  Her weight was 255 pounds in March and she was diuresed quite effectively down to 226 pounds and then to 235 pounds.      It was felt that the diuresis had caused her acute bump in his creatinine which was up to 5.97 on presentation.  She was also noted to be severely hypoglycemic with  Sugar of 21 on presentation.      In the days leading to her presentation, she also noted worsening of her bilateral leg edema.  Further evaluation included a CT scan of the head as well as chest x-ray.  No infectious etiology was found.  CT scan of the head did not reveal any acute changes.  She was seen by nephrology who recommended to continue holding her ACE inhibitor  hydrochlorothiazide and Lasix.  She was placed on IV fluid hydration.  Her oral antidiabetic medications were held metformin and glipizide and this was replaced with Januvia 25mg.  Her creatinine eventually improved from 5.97-4.76 and down to 2.17.  Initial evaluation also revealed leukocytosis with WBC count of 12.4 no infectious etiology.      Magnesium levels were also normal and this was replaced.  At the time of discharge she was safely placed back on furosemide 20 mg once a day.      She has developed blisters on the right shin because of increasing edema these have all busted and is healing and is drying up.      Her blood pressure has improved she denies any dizziness or syncopal episode no palpitations.  She is urinating a lot she is also drinking a lot  Denies any urinary hesitancy or dysuria or low back pain  Blood sugars are much better however they are trending towards the higher 100s.During her stay at the transitional care unit, her blood sugars continue to go up.  Her Januvia had to be increased to 50 mg and glipizide reinitiated initially at 2.5 then later increased to 5 mg.  Nursing staff has also noted an open area in the buttock a stage II on admission.  This had healed completely.     She worked with physical therapy and did very well.  She did not have any increased dyspnea on exertion.  On the last  Few days before her discharge, she started increasing her weight.  She went from 235 pounds to 239 pounds.  Her Lasix was increased to 40 mg p.o. Daily.    Family intends to find her an assisted living apartment but in the meantime she will go to long-term care until then.      Today was the first day I saw her again after she transferred. SHe has been adjsuting well to her new surrounding. She however has been having a nagging cough for the last couple of days. THis is not associated with sputum prod, or fevers, or chills. Not able to sleep well because of this. No relationship to food of position  or time of day. Has history of asthma. No fevers/chills,   Her weight has been stable at 239 lbs,   Eatign fairly well, taking care not to eat added salt,     Leg edema present, stockings have not come in yet,   No cp/SOB/abd pain, / diarrhea/constipation is resolved, no urinary issues.              Past Medical History:  Past Medical History:   Diagnosis Date     CKD (chronic kidney disease)      DM type 2 (diabetes mellitus, type 2)      GERD (gastroesophageal reflux disease)      Hypercholesteremia      Hypertension      Obesity            Surgical History:  Past Surgical History:   Procedure Laterality Date     EYE SURGERY      bilat     TN EXPLORE PARATHYROID+MEDIASTINUM      Description: Parathyroid Sub-Total Parathyroidect W/ Medias Exploration;  Proc Date: 07/27/2010;  Comments: Dr Jose Angel Alonzo     TN REVISE MEDIAN N/CARPAL TUNNEL SURG      Description: Neuroplasty Decompression Median Nerve At Carpal Tunnel;  Recorded: 12/13/2007;       Family History:   No family history on file.    Social History:    Social History     Social History     Marital status: Single     Spouse name: N/A     Number of children: N/A     Years of education: N/A     Social History Main Topics     Smoking status: Former Smoker     Smokeless tobacco: Not on file     Alcohol use No     Drug use: No     Sexual activity: Not on file     Other Topics Concern     Not on file     Social History Narrative          Review of Systems  Unremarkable otherwise  There were no vitals filed for this visit.    Physical Exam  Vital signs noted.  Weight is stable at 239 pounds.  Blood sugars are still up in the 200s.  Patient is alert, awake, oriented to time, place and person, not in acute cardiorespiratory distress  Skin: Warm, and moist,  no lesions,   Head: atraumatic, normocephalic, no sinus tenderness no postnasal drainage  Eyes: EOM's intact and conjugate, pink palpebral conjunctivae, anicteric sclerae, pupils reactive  Lungs : decreased but  clear to  auscultation , no crackles, wheezes or rhonchi  Heart : Nornal first and second heart sounds, No murmurs, heaves, or thrills  Abdomen: Soft, non tender, non distended, no hepatosplenomegaly, no ascites  Extremities: unchanged bipedal edema, pulses are full and equal      Medication List:  Current Outpatient Prescriptions   Medication Sig     acetaminophen (TYLENOL) 500 MG tablet Take 500 mg by mouth every 4 (four) hours as needed. Every 4-6 hours as needed. DO NOT EXCEED 4000 MG IN 24 HOURS.     allopurinol (ZYLOPRIM) 300 MG tablet Take 300 mg by mouth daily with supper.     aspirin 81 MG EC tablet Take 81 mg by mouth daily with supper.     atorvastatin (LIPITOR) 80 MG tablet Take 80 mg by mouth at bedtime.     cholecalciferol, vitamin D3, 1,000 unit tablet Take 1,000 Units by mouth daily.     CONTOUR TEST STRIPS strips USE AS DIRECTED TO TEST TWICE A DAY     fenofibrate (TRICOR) 48 MG tablet Take 48 mg by mouth at bedtime.     furosemide (LASIX) 20 MG tablet Take 1 tablet (20 mg total) by mouth daily. (Patient taking differently: Take 40 mg by mouth daily. )     glipiZIDE (GLUCOTROL) 5 MG tablet Take 5 mg by mouth daily.     LANCETS MISC Juni Microlet Lancets Miscellaneous  Use As Directed     melatonin 3 mg Tab tablet Take 1 tablet (3 mg total) by mouth bedtime as needed.     mineral oil-hydrophil petrolatum (MINERAL OIL-HYDROPHILIC PETROLATUM) Oint Apply topically 3 (three) times a day as needed.      NOVOLOG 100 unit/mL injection Check blood sugar three (3) times daily.  11.9 Type 2 without complications  Microlet Color Lancets (100s) - dispense 1, refill PRN for 1 year  Flex Pen Needles - BD Ultra-fine Dona Pen Needles - NDC [address]-05 - dispense 1 case, refill PRN for 1 year     sitaGLIPtin (JANUVIA) 25 MG tablet Take 1 tablet (25 mg total) by mouth daily. (Patient taking differently: Take 50 mg by mouth daily. )       Labs:  No results found for this or any previous visit (from the past 168  hour(s)).      Assessment:    ICD-10-CM    1. Cough R05    2. History of asthma Z87.09    3. Acute on chronic kidney failure N17.9     N18.9    4. Uncontrolled diabetes mellitus E11.65    5. Essential hypertension with goal blood pressure less than 140/90 I10        Plan:  Symptomatic treatment of codeine 30 mg every 4 hours as needed.  Albuterol every 8 hours for the next several days.  Her cough might be  1 of her small flares of her asthma.  Follow-up on compression stockings.  For her uncontrolled diabetes, I have increased Januvia to 100 mg continue 5 mg of glipizide.  Continue with furosemide 40 mg p.o. daily.  Continue daily weights.    Total time spent was 60 minutes with more than 50% spent on counseling, discussion of treatment plan and extensive review of available records  This note has been dictated using voice recognition software. Any grammatical, typographical, or context distortions are unintentional.          Electronically signed by: Popeye Raman MD

## 2021-06-11 NOTE — PROGRESS NOTES
ASSESMENT AND PLAN:  Diagnoses and all orders for this visit:    Type 2 diabetes mellitus with hypoglycemia without coma, without long-term current use of insulin  Reviewed her A1c trend, it has been and continues to be below 6.  I do not have documented blood sugar log from her nursing home to review today but there is no report of hypoglycemia.  Therefore, we decided to continue with her current treatment plan which is glipizide, Januvia, and sliding scale insulin.  Paperwork signed today for her nursing home, counseled the patient I would like to see her after she is discharged back to her community living setting and in the meantime can continue with [facility] as her provider in the meantime while she is a resident at the nursing home.        SUBJECTIVE: 76-year-old female who had been hospitalized with hypoglycemia and acute renal failure and was discharged to the nursing home.  Her creatinine had returned to almost normal on last check.  Her last A1c was 5.8.  She is not been having any symptomatic hypoglycemia since being at the nursing home but is not sure what her blood sugar readings have been, I did check the paperwork from the nursing home and they are checking it 3 times a day but I do not have results.  Patient feels like her strength is improved and she is almost back to her normal baseline.  She is working with the  at the nursing home to work on a community placement when she is discharged from the nursing home.    Past Medical History:   Diagnosis Date     CKD (chronic kidney disease)      DM type 2 (diabetes mellitus, type 2)      GERD (gastroesophageal reflux disease)      Hypercholesteremia      Hypertension      Obesity      Patient Active Problem List   Diagnosis     Vitamin D Deficiency     Muscle Cramps In The Calf     Constipation     Gait - Ataxic     Chronic Gout     Essential hypertension     Urine Tests Nonspecific Abnormal Findings     Cognitive  disorder     Esophageal reflux     Localized Osteoarthritis Of The Vertebral Column     Tendonitis Of The Right Shoulder     Morbid obesity     Lower Back Pain     Candidal Intertrigo     Type II diabetes mellitus     Hypercholesterolemia     Chronic Kidney Disease, Stage 3     Hypercalcemia     Hyperlipidemia     Hyperparathyroidism     Serum Enzyme Levels - ALT (SGPT) Elevated     Cataract     Acute renal failure     Current Outpatient Prescriptions   Medication Sig Dispense Refill     acetaminophen (TYLENOL) 500 MG tablet Take 500 mg by mouth every 4 (four) hours as needed. Every 4-6 hours as needed. DO NOT EXCEED 4000 MG IN 24 HOURS.       allopurinol (ZYLOPRIM) 300 MG tablet Take 300 mg by mouth daily with supper.       aspirin 81 MG EC tablet Take 81 mg by mouth daily with supper.       atorvastatin (LIPITOR) 80 MG tablet Take 80 mg by mouth at bedtime.       cholecalciferol, vitamin D3, 1,000 unit tablet Take 1 tablet (1,000 Units total) by mouth daily. 90 tablet 2     CONTOUR TEST STRIPS strips USE AS DIRECTED TO TEST TWICE A  strip 6     fenofibrate (TRICOR) 48 MG tablet Take 48 mg by mouth at bedtime.       furosemide (LASIX) 20 MG tablet Take 1 tablet (20 mg total) by mouth daily. (Patient taking differently: Take 40 mg by mouth daily. ) 30 tablet 1     glipiZIDE (GLUCOTROL) 5 MG tablet Take 5 mg by mouth daily.       LANCETS MISC Juin Microlet Lancets Miscellaneous  Use As Directed       melatonin 3 mg Tab tablet Take 1 tablet (3 mg total) by mouth bedtime as needed. 90 tablet 3     mineral oil-hydrophil petrolatum (MINERAL OIL-HYDROPHILIC PETROLATUM) Oint Apply topically 3 (three) times a day as needed.        NOVOLOG 100 unit/mL injection Check blood sugar three (3) times daily.  11.9 Type 2 without complications  Microlet Color Lancets (100s) - dispense 1, refill PRN for 1 year  Flex Pen Needles - BD Ultra-fine Dona Pen Needles - NDC [address]-60 - dispense 1 case, refill PRN for 1 year 10 mL  "PRN     sitaGLIPtin (JANUVIA) 25 MG tablet Take 1 tablet (25 mg total) by mouth daily. (Patient taking differently: Take 100 mg by mouth daily. )  0     No current facility-administered medications for this visit.      History   Smoking Status     Former Smoker   Smokeless Tobacco     Not on file       OBJECTICE: /68 (Patient Site: Right Arm, Patient Position: Sitting, Cuff Size: Adult Large)  Pulse 76  Temp 98.6  F (37  C) (Oral)   Resp 20  Ht 5' 1\" (1.549 m)  Wt (!) 238 lb 8 oz (108.2 kg)  Breastfeeding? No  BMI 45.06 kg/m2     No results found for this or any previous visit (from the past 24 hour(s)).     GEN-alert, appropriate, in no apparent distress   HEENT-mucous members are moist, sclera are clear   CV-regular rate and rhythm   RESP-lungs clear to auscultation   EXTREM-warm with no edema   SKIN-no ulcers or vesicles      Jose Angel Mena          "

## 2021-06-11 NOTE — PROGRESS NOTES
[facility]    Facility:   [facility] [[id number]]   Code Status: FULL CODE      CHIEF COMPLAINT/REASON FOR VISIT:  Chief Complaint   Patient presents with     Review Of Multiple Medical Conditions       HISTORY:      HPI: Josee is a 76 y.o. female seen today in follow-up of her multiple issues.  Admitted to the hospital because of severe bradycardia hyperkalemia acute and chronic kidney disease.  Related to overdiuresis and electrolyte abnormalities secondary to diuresis.  Electrolytes are pretty much repleted and stable we are checking magnesium today.  She has been doing well with therapy progressing well.  Still constipated however though having to strain last bowel movement more than 2 days ago.    Eating and drinking okay.  234 pounds today.    Past Medical History:   Diagnosis Date     CKD (chronic kidney disease)      DM type 2 (diabetes mellitus, type 2)      GERD (gastroesophageal reflux disease)      Hypercholesteremia      Hypertension      Obesity              No family history on file.  Social History     Social History     Marital status: Single     Spouse name: N/A     Number of children: N/A     Years of education: N/A     Social History Main Topics     Smoking status: Former Smoker     Smokeless tobacco: Not on file     Alcohol use No     Drug use: No     Sexual activity: Not on file     Other Topics Concern     Not on file     Social History Narrative         Review of Systems  Unremarkable otherwise  .There were no vitals filed for this visit.    Physical Exam  Vital signs stable except for blood sugars still running in the 200s.  Weight today is 234 from 235  Patient is alert, awake, oriented to time, place and person, not in acute cardiorespiratory distress  Skin: Warm, and moist,  no lesions,   Head: atraumatic, normocephalic,   Eyes: EOM's intact and conjugate, pink palpebral conjunctivae, anicteric sclerae, pupils reactive  Lungs : Clear to auscultation  , no crackles, wheezes or rhonchi  Heart : Nornal first and second heart sounds, No murmurs, heaves, or thrills  Abdomen: Soft, non tender, non distended, no hepatosplenomegaly, no ascites slow bowel sounds  Extremities: Improved bipedal edema, pulses are full and equal      LABS:   Recent Results (from the past 168 hour(s))   Basic Metabolic Panel   Result Value Ref Range    Sodium 137 136 - 145 mmol/L    Potassium 3.9 3.5 - 5.0 mmol/L    Chloride 104 98 - 107 mmol/L    CO2 25 22 - 31 mmol/L    Anion Gap, Calculation 8 5 - 18 mmol/L    Glucose 199 (H) 70 - 125 mg/dL    Calcium 9.5 8.5 - 10.5 mg/dL    BUN 35 (H) 8 - 28 mg/dL    Creatinine 1.50 (H) 0.60 - 1.10 mg/dL    GFR MDRD Af Amer 41 (L) >60 mL/min/1.73m2    GFR MDRD Non Af Amer 34 (L) >60 mL/min/1.73m2         ASSESSMENT:      ICD-10-CM    1. Acute on chronic kidney failure N17.9     N18.9    2. DM2 (diabetes mellitus, type 2) E11.9    3. Constipation K59.00    4. Hypomagnesemia E83.42        PLAN:    Creatinine further improved back to baseline now.  Recheck magnesium early next week continue to monitor weight and fluid status.  Clinically euvolemic.    Increase Januvia to 50 mg  Glipizide and metformin has been discontinued.  MiraLAX daily started.  Prune juice offered    Total 35 minutes of which 55% was spent counseling and coordination of care of the above plan.    Electronically signed by: Popeye Raman MD

## 2021-06-11 NOTE — PROGRESS NOTES
[facility]    Facility:   [facility] [[id number]]   Code Status: FULL CODE  PCP: Jose Angel Mena MD   Phone: [phone]   Fax: [phone]      CHIEF COMPLAINT/REASON FOR VISIT:  Chief Complaint   Patient presents with     Discharge Summary       HISTORY COURSE:  Josee is a 76 y.o. female who was admitted to [facility] on May 16 and transferred to this facility on May 20, 2017.  She is a patient of the [facility].  She has a medical history significant for type 2 diabetes on glipizide and metformin, hypertension, gout, cognitive disorder, obesity, GERD, CKD 3 with baseline of 1.63 on diuretics and obecity.  She used to live in an apartment and walks around with the use of a cane she has 2 kids ages 41 and 40 with no grandkids.  She presented to her primary care physician with bradycardia.  She fell 2 weeks prior to admission to the hospital.  It is unclear what symptoms brought her in to her primary care physician's office.  At any rate with a bradycardia, his beta-blocker atenolol was stopped and blood work was done.  He was also hypotensive at that time.  Blood work came back abnormal and so she was advised to get into the hospital for further evaluation.     On further questioning, she and her doctor had been working to get her lose some weight and water retention for the last 2 months.  Her weight was 255 pounds in March and she was diuresed quite effectively down to 226 pounds and then to 235 pounds.     It was felt that the diuresis had caused her acute bump in his creatinine which was up to 5.97 on presentation.  She was also noted to be severely hypoglycemic with  Sugar of 21 on presentation.     In the days leading to her presentation, she also noted worsening of her bilateral leg edema.  Further evaluation included a CT scan of the head as well as chest x-ray.  No infectious etiology was found.  CT scan of the head did not reveal any acute changes.   She was seen by nephrology who recommended to continue holding her ACE inhibitor hydrochlorothiazide and Lasix.  She was placed on IV fluid hydration.  Her oral antidiabetic medications were held metformin and glipizide and this was replaced with Januvia 25mg.  Her creatinine eventually improved from 5.97-4.76 and down to 2.17.  Initial evaluation also revealed leukocytosis with WBC count of 12.4 no infectious etiology.     Magnesium levels were also normal and this was replaced.  At the time of discharge she was safely placed back on furosemide 20 mg once a day.     She has developed blisters on the right shin because of increasing edema these have all busted and is healing and is drying up.     Her blood pressure has improved she denies any dizziness or syncopal episode no palpitations.  She is urinating a lot she is also drinking a lot  Denies any urinary hesitancy or dysuria or low back pain  Blood sugars are much better however they are trending towards the higher 100s.During her stay at the transitional care unit, her blood sugars continue to go up.  Her Januvia had to be increased to 50 mg and glipizide reinitiated initially at 2.5 then later increased to 5 mg.  Nursing staff has also noted an open area in the buttock a stage II on admission.  This had healed completely.    She worked with physical therapy and did very well.  She did not have any increased dyspnea on exertion.  On the last  Few days before her discharge, she started increasing her weight.  She went from 235 pounds to 239 pounds.  Her Lasix was increased to 40 mg p.o. Daily.    Family intends to find her an assisted living apartment but in the meantime she will go to long-term care until then.  Unremarkable         Review of Systems  Unremarkable vital signs noted and stable.  Her weight is 200  There were no vitals filed for this visit.    Physical Exam  236 pounds  Patient is alert, awake, oriented to time, place and person, not in acute  cardiorespiratory distress  Skin: Warm, and moist,  no lesions,   Head: atraumatic, normocephalic,   Eyes: EOM's intact and conjugate, pink palpebral conjunctivae, anicteric sclerae, pupils reactive  Lungs : Clear to auscultation , no crackles, wheezes or rhonchi  Heart : Nornal first and second heart sounds, No murmurs, heaves, or thrills  Abdomen: Soft, non tender, non distended, no hepatosplenomegaly, no ascites  Extremities edema on bilateral legs , pulses are full and equal      MEDICATION LIST:  Current Outpatient Prescriptions   Medication Sig     glipiZIDE (GLUCOTROL) 5 MG tablet Take 5 mg by mouth daily.     acetaminophen (TYLENOL) 500 MG tablet Take 500 mg by mouth every 4 (four) hours as needed. Every 4-6 hours as needed. DO NOT EXCEED 4000 MG IN 24 HOURS.     allopurinol (ZYLOPRIM) 300 MG tablet Take 300 mg by mouth daily with supper.     aspirin 81 MG EC tablet Take 81 mg by mouth daily with supper.     atorvastatin (LIPITOR) 80 MG tablet Take 80 mg by mouth at bedtime.     cholecalciferol, vitamin D3, 1,000 unit tablet Take 1,000 Units by mouth daily.     CONTOUR TEST STRIPS strips USE AS DIRECTED TO TEST TWICE A DAY     fenofibrate (TRICOR) 48 MG tablet Take 48 mg by mouth at bedtime.     furosemide (LASIX) 20 MG tablet Take 1 tablet (20 mg total) by mouth daily. (Patient taking differently: Take 40 mg by mouth daily. )     LANCETS MIS Juni Microlet Lancets Miscellaneous  Use As Directed     melatonin 3 mg Tab tablet Take 1 tablet (3 mg total) by mouth bedtime as needed.     mineral oil-hydrophil petrolatum (MINERAL OIL-HYDROPHILIC PETROLATUM) Oint Apply topically 3 (three) times a day as needed.      NOVOLOG 100 unit/mL injection Check blood sugar three (3) times daily.  11.9 Type 2 without complications  Microlet Color Lancets (100s) - dispense 1, refill PRN for 1 year  Flex Pen Needles - BD Ultra-fine Dona Pen Needles - NDC [address]-63 - dispense 1 case, refill PRN for 1 year     sitaGLIPtin  (JANUVIA) 25 MG tablet Take 1 tablet (25 mg total) by mouth daily. (Patient taking differently: Take 50 mg by mouth daily. )       DISCHARGE DIAGNOSIS:    ICD-10-CM    1. Acute on chronic kidney failure N17.9     N18.9    2. DM2 (diabetes mellitus, type 2) E11.9    3. Lower extremity edema R60.0    4. Essential hypertension with goal blood pressure less than 140/90 I10        MEDICAL EQUIPMENT NEEDS:  Front-wheeled walker    DISCHARGE PLAN/FACE TO FACE:  I certify that services are/were furnished while this patient was under the care of a physician and that a physician or an allowed non-physician practitioner (NPP), had a face-to-face encounter that meets the physician face-to-face encounter requirements. The encounter was in whole, or in part, related to the primary reason for home health. The patient is confined to his/her home and needs intermittent skilled nursing, physical therapy, speech-language pathology, or the continued need for occupational therapy. A plan of care has been established by a physician and is periodically reviewed by a physician.  Date of Face-to-Face Encounter: 06/12/17     I certify that, based on my findings, the following services are medically necessary home health services: She will use services provided this long-term care.  Namely physical therapy and occupational therapy gait instability secondary to bipedal edema     My clinical findings support the need for the above skilled services because: (Please write a brief narrative summary that describes what the RN, PT, SLP, or other services will be doing in the home. A list of diagnoses in this section does not meet the CMS requirements.)                                                                                                                                                                                   gait instability secondary to bipedal edema same   This patient is homebound because: (Please write a brief narrative  summary describing the functional limitations as to why this patient is homebound and specifically what makes this patient homebound.)     The patient is, or has been, under my care and I have initiated the establishment of the plan of care. This patient will be followed by a physician who will periodically review the plan of care.  This note has been dictated using voice recognition software. Any grammatical, typographical, or context distortions are unintentional.    Total discharge time was more than 35 minutes.      Electronically signed by: Popeye Raman MD

## 2021-06-11 NOTE — PROGRESS NOTES
[facility]    Facility:   [facility] [[id number]]   Code Status: FULL CODE      CHIEF COMPLAINT/REASON FOR VISIT:  Chief Complaint   Patient presents with     Review Of Multiple Medical Conditions       HISTORY:      HPI: Josee is a 76 y.o. female seen today in follow-up of her multiple issues.  I saw her walking with physical therapy.  She does better with front-wheeled walker.  She has been walking with standby assist.  Some weight has come back.  She weighs 239 pounds today from 234 pounds in some water retention apparent in the legs.  Denies any pain eating well no nausea no vomiting bowel movements have been regular    Blood sugars are high in the 200s.  It is important to note that both her metformin and glipizide were discontinued in the hospital and Januvia started currently on 50 mg p.o. daily.    Past Medical History:   Diagnosis Date     CKD (chronic kidney disease)      DM type 2 (diabetes mellitus, type 2)      GERD (gastroesophageal reflux disease)      Hypercholesteremia      Hypertension      Obesity              No family history on file.  Social History     Social History     Marital status: Single     Spouse name: N/A     Number of children: N/A     Years of education: N/A     Social History Main Topics     Smoking status: Former Smoker     Smokeless tobacco: Not on file     Alcohol use No     Drug use: No     Sexual activity: Not on file     Other Topics Concern     Not on file     Social History Narrative         Review of Systems  Unremarkable except for those noted above  .There were no vitals filed for this visit.    Physical Exam  Vital signs noted.  Blood sugars 200s.  Weight 239 pounds.  Patient is alert, awake, oriented to time, place and person, not in acute cardiorespiratory distress  Skin: Warm, and moist,  no lesions,   Head: atraumatic, normocephalic,   Eyes: EOM's intact and conjugate, pink palpebral conjunctivae, anicteric sclerae, pupils  reactive  Lungs : Clear to auscultation , no crackles, wheezes or rhonchi  Heart : Nornal first and second heart sounds, No murmurs, heaves, or thrills  Abdomen: Soft, non tender, non distended, no hepatosplenomegaly, no ascites  Extremities: Increased bipedal edema, pulses are full and equal      LABS:   Recent Results (from the past 72 hour(s))   Basic Metabolic Panel   Result Value Ref Range    Sodium 137 136 - 145 mmol/L    Potassium 3.9 3.5 - 5.0 mmol/L    Chloride 102 98 - 107 mmol/L    CO2 23 22 - 31 mmol/L    Anion Gap, Calculation 12 5 - 18 mmol/L    Glucose 233 (H) 70 - 125 mg/dL    Calcium 10.0 8.5 - 10.5 mg/dL    BUN 38 (H) 8 - 28 mg/dL    Creatinine 1.60 (H) 0.60 - 1.10 mg/dL    GFR MDRD Af Amer 38 (L) >60 mL/min/1.73m2    GFR MDRD Non Af Amer 31 (L) >60 mL/min/1.73m2   Magnesium   Result Value Ref Range    Magnesium 1.7 (L) 1.8 - 2.6 mg/dL         ASSESSMENT:      ICD-10-CM    1. Acute on chronic kidney failure N17.9     N18.9    2. Uncontrolled diabetes mellitus E11.65    3. Essential hypertension with goal blood pressure less than 140/90 I10    4. Hypomagnesemia E83.42    5. Gait instability R26.81        PLAN:    6 kidney function continues to improve however slight bump in her creatinine level.  We will continue to monitor this closely.  Increase furosemide to 40 mg p.o. daily and monitor closely for overdiuresis.  Recheck BMP early next week.  Add back a small dose of glipizide 2.5 mg p.o. Daily.  She will be needing a front-wheeled walker for assistance with ambulation.  Family is currently looking for an alternative living arrangements such as assisted living.    Total 25 minutes of which 55% was spent counseling and coordination of care of the above plan.    Electronically signed by: Popeye Raman MD

## 2021-06-12 NOTE — PROGRESS NOTES
[facility]    Facility:   [facility] [[id number]]   Code Status: POLST AVAILABLE      CHIEF COMPLAINT/REASON FOR VISIT:  Chief Complaint   Patient presents with     Review Of Multiple Medical Conditions       HISTORY:      HPI: Josee is a 76 y.o. female who I am seeing today in follow-up of her multiple medical problems.  History of diabetes, chronic kidney disease with recent acute kidney injury and hypoglycemia.  Hypertension obesity and GERD.  She has been doing quite well at the nursing home.  She walks independently with the use of her walker.  She is very stable.  She is back to her baseline before her admission to the hospital.    She does get winded when she walks the entire along the hallway.  But otherwise really no symptoms.  She is working towards moving to [facility] independent assisted living.  Lower extremities are still swollen but not as swollen as before.  Her leg wounds have gotten better and they are completely healed.  Blood sugars however have been consistently high between 200s-300s.  Blood pressure stable.    Past Medical History:   Diagnosis Date     CKD (chronic kidney disease)      DM type 2 (diabetes mellitus, type 2)      GERD (gastroesophageal reflux disease)      Hypercholesteremia      Hypertension      Obesity              No family history on file.  Social History     Social History     Marital status: Single     Spouse name: N/A     Number of children: N/A     Years of education: N/A     Social History Main Topics     Smoking status: Former Smoker     Smokeless tobacco: Not on file     Alcohol use No     Drug use: No     Sexual activity: Not on file     Other Topics Concern     Not on file     Social History Narrative         Review of Systems  Unremarkable  .There were no vitals filed for this visit.    Physical Exam  Vital signs noted.  Patient is alert, awake, oriented to time, place and person, not in acute cardiorespiratory  distress  Skin: Warm, and moist,  no lesions,   Head: atraumatic, normocephalic,   Eyes: EOM's intact and conjugate, pink palpebral conjunctivae, anicteric sclerae, pupils reactive  Lungs : Clear to auscultation , no crackles, wheezes or rhonchi  Heart : Nornal first and second heart sounds, No murmurs, heaves, or thrills  Abdomen: Soft, non tender, non distended, no hepatosplenomegaly, no ascites  Extremities: Bipedal mildly pitting edema, all wounds have closed pulses are full and equal      LABS:   Lab Results   Component Value Date    WBC 12.5 (H) 05/16/2017    HGB 10.0 (L) 05/16/2017    HCT 31.7 (L) 05/16/2017    MCV 90 05/16/2017     05/20/2017     Results for orders placed or performed in visit on 06/23/17   Basic Metabolic Panel   Result Value Ref Range    Sodium 135 (L) 136 - 145 mmol/L    Potassium 3.5 3.5 - 5.0 mmol/L    Chloride 97 (L) 98 - 107 mmol/L    CO2 32 (H) 22 - 31 mmol/L    Anion Gap, Calculation 6 5 - 18 mmol/L    Glucose 227 (H) 70 - 125 mg/dL    Calcium 9.9 8.5 - 10.5 mg/dL    BUN 34 (H) 8 - 28 mg/dL    Creatinine 1.50 (H) 0.60 - 1.10 mg/dL    GFR MDRD Af Amer 41 (L) >60 mL/min/1.73m2    GFR MDRD Non Af Amer 34 (L) >60 mL/min/1.73m2           ASSESSMENT:      ICD-10-CM    1. Chronic Kidney Disease, Stage 3 N18.3    2. Type 2 diabetes mellitus with hypoglycemia without coma, without long-term current use of insulin E11.649    3. Obesity E66.9    4. Gait instability R26.81    5. Uncontrolled diabetes mellitus E11.65        PLAN:    She is at the borderline of her creatinine clearance of 30.  I am concerned about hypoglycemia should we increase her glipizide more.  However her blood sugars are running between 200s-300s consistently.  After discussing we have decided to place her on a small dose of Lantus 6 units at at bedtime.  I have asked the nursing staff to follow up on the tense socks that we have ordered the last time.  Add a low-dose lisinopril for renal protection.  5 mg.  Recheck  BMP in a week.  She is in the process of being evaluated for transfer to [place].    Total 25 minutes of which 55% was spent counseling and coordination of care of the above plan.    Electronically signed by: Popeye Raman MD

## 2021-06-12 NOTE — PROGRESS NOTES
[facility]    Facility:   [facility] [[id number]]   Code Status: FULL CODE      CHIEF COMPLAINT/REASON FOR VISIT:  Chief Complaint   Patient presents with     Review Of Multiple Medical Conditions       HISTORY:      HPI: Josee is a 76 y.o. female who was seen secondary to monthly review of chronic medical conditions.  She does have significant chronic past medical history.  She was hospitalized May 16 secondary to bradycardia.  Hypotensive.  She also does have obesity.  Generalized weakness.  She does have chronic lower extremity edema along with obesity.  She states that she is going home to another apartment but she is not sure where yet probably the site of [place] I am not sure who is taking care of that for her but it any rate she does use a wheelchair as well as 2 wheeled walker.  Her A1c was in May see results below current blood sugars ranging 126-350.  Not having any further discomfort or any new aches or pains.  She does feel much better with increased energy.  Appetite is good.  She does need assistance with all ADLs so upon going home she states she will need lots of nursing services.    Past Medical History:   Diagnosis Date     CKD (chronic kidney disease)      DM type 2 (diabetes mellitus, type 2)      GERD (gastroesophageal reflux disease)      Hypercholesteremia      Hypertension      Obesity              No family history on file.  Social History     Social History     Marital status: Single     Spouse name: N/A     Number of children: N/A     Years of education: N/A     Social History Main Topics     Smoking status: Former Smoker     Smokeless tobacco: Not on file     Alcohol use No     Drug use: No     Sexual activity: Not on file     Other Topics Concern     Not on file     Social History Narrative         Review of Systems  Currently she denies chills and fever coughing wheezing chest pain dizziness or vertigo nausea vomiting diarrhea dysuria rashes or  sores.  History of diabetes hypertension chronic lower extremity edema    Current Outpatient Prescriptions   Medication Sig     acetaminophen (TYLENOL) 500 MG tablet Take 500 mg by mouth every 4 (four) hours as needed. Every 4-6 hours as needed. DO NOT EXCEED 4000 MG IN 24 HOURS.     allopurinol (ZYLOPRIM) 300 MG tablet Take 300 mg by mouth daily with supper.     aspirin 81 MG EC tablet Take 81 mg by mouth daily with supper.     atorvastatin (LIPITOR) 80 MG tablet Take 80 mg by mouth at bedtime.     cholecalciferol, vitamin D3, 1,000 unit tablet Take 1 tablet (1,000 Units total) by mouth daily.     CONTOUR TEST STRIPS strips USE AS DIRECTED TO TEST TWICE A DAY     fenofibrate (TRICOR) 48 MG tablet Take 48 mg by mouth at bedtime.     furosemide (LASIX) 20 MG tablet Take 1 tablet (20 mg total) by mouth daily. (Patient taking differently: Take 40 mg by mouth daily. )     glipiZIDE (GLUCOTROL) 5 MG tablet Take 5 mg by mouth daily.     LANCETS MISC [facility] Microlet Lancets Miscellaneous  Use As Directed     melatonin 3 mg Tab tablet Take 1 tablet (3 mg total) by mouth bedtime as needed.     mineral oil-hydrophil petrolatum (MINERAL OIL-HYDROPHILIC PETROLATUM) Oint Apply topically 3 (three) times a day as needed.      NOVOLOG 100 unit/mL injection Check blood sugar three (3) times daily.  11.9 Type 2 without complications  Microlet Color Lancets (100s) - dispense 1, refill PRN for 1 year  Flex Pen Needles - BD Ultra-fine Dona Pen Needles - NDC [address]-38 - dispense 1 case, refill PRN for 1 year     sitaGLIPtin (JANUVIA) 25 MG tablet Take 1 tablet (25 mg total) by mouth daily. (Patient taking differently: Take 100 mg by mouth daily. )       .There were no vitals filed for this visit.  Blood pressure 140/68 pulse 67 respirations 16 temperature 97.9  Physical Exam   Constitutional: She appears well-nourished. No distress.   HENT:   Head: Normocephalic.   Eyes: Pupils are equal, round, and reactive to light.   Neck: Neck  supple. No thyromegaly present.   Cardiovascular: Normal rate, regular rhythm and normal heart sounds.    Chronic lower extremity edema.  Nonpitting.   Pulmonary/Chest: Breath sounds normal.   Abdominal: Bowel sounds are normal. There is no tenderness. There is no guarding.   Protuberant.   Musculoskeletal:   Denies pain.  Obese.  Able to use a walker.   Lymphadenopathy:     She has no cervical adenopathy.   Neurological: She is alert.   Skin: Skin is warm and dry. No rash noted.         LABS:   Lab Results   Component Value Date    HGBA1C 5.8 05/15/2017     Lab Results   Component Value Date    WBC 12.5 (H) 05/16/2017    HGB 10.0 (L) 05/16/2017    HCT 31.7 (L) 05/16/2017    MCV 90 05/16/2017     05/20/2017     Results for orders placed or performed in visit on 06/23/17   Basic Metabolic Panel   Result Value Ref Range    Sodium 135 (L) 136 - 145 mmol/L    Potassium 3.5 3.5 - 5.0 mmol/L    Chloride 97 (L) 98 - 107 mmol/L    CO2 32 (H) 22 - 31 mmol/L    Anion Gap, Calculation 6 5 - 18 mmol/L    Glucose 227 (H) 70 - 125 mg/dL    Calcium 9.9 8.5 - 10.5 mg/dL    BUN 34 (H) 8 - 28 mg/dL    Creatinine 1.50 (H) 0.60 - 1.10 mg/dL    GFR MDRD Af Amer 41 (L) >60 mL/min/1.73m2    GFR MDRD Non Af Amer 34 (L) >60 mL/min/1.73m2           ASSESSMENT:      ICD-10-CM    1. Type 2 diabetes mellitus with hypoglycemia without coma, without long-term current use of insulin E11.649    2. Chronic Kidney Disease, Stage 3 N18.3    3. Lumbago M54.5    4. Morbid obesity, unspecified obesity type E66.01        PLAN:    At this time no further workup is necessary.  Continue to monitor blood sugars and blood pressures plus looking over her care she will probably need a significant amount of services if she does leave no  is present to confirm any of this data or information but otherwise will hopefully keep us updated with any of these concerns.      Electronically signed by: Michael Duane Johnson, CNP

## 2021-06-13 NOTE — PROGRESS NOTES
[facility]    Facility:   [facility] [[id number]]   Code Status: FULL CODE      CHIEF COMPLAINT/REASON FOR VISIT:  Chief Complaint   Patient presents with     Review Of Multiple Medical Conditions       HISTORY:      HPI: Josee is a 77 y.o. female who was seen secondary to monthly review of chronic medical conditions.  Had a chance to visit with her including her discussion of her diabetes.  The blood sugar is still elevated she is aware of that she does have multiple snacks and pop in her room.  Her appetite has been good Lantus was started not too long ago at 6 units and now increase that to 10 units plus her blood pressures have been slightly elevated so now increase the lisinopril 10 mg rather than 5 mg.  Been a while since she has had an A1c and lipid panel but she also tells me that she possibly will be leaving there soon and going to another assisted living or an apartment is working on that with a  also possibly hold off on the labs until she goes to her new place but will keep that in mind for the near future if she does remain there.  She does have chronic edema.  Does need assistance with ADLs.  Does try to keep independent using a walker within the room    Past Medical History:   Diagnosis Date     CKD (chronic kidney disease)      DM type 2 (diabetes mellitus, type 2)      GERD (gastroesophageal reflux disease)      Hypercholesteremia      Hypertension      Obesity              No family history on file.  Social History     Social History     Marital status: Single     Spouse name: N/A     Number of children: N/A     Years of education: N/A     Social History Main Topics     Smoking status: Former Smoker     Smokeless tobacco: Not on file     Alcohol use No     Drug use: No     Sexual activity: Not on file     Other Topics Concern     Not on file     Social History Narrative         Review of Systems  Currently she denies chills and fever coughing wheezing  chest pain dizziness or vertigo nausea vomiting diarrhea dysuria rashes or sores.  History of diabetes hypertension chronic lower extremity edema    Current Outpatient Prescriptions   Medication Sig     insulin glargine (LANTUS) 100 unit/mL injection Inject 10 Units under the skin at bedtime.     lisinopril (PRINIVIL,ZESTRIL) 10 MG tablet Take 10 mg by mouth daily.     acetaminophen (TYLENOL) 500 MG tablet Take 500 mg by mouth every 4 (four) hours as needed. Every 4-6 hours as needed. DO NOT EXCEED 4000 MG IN 24 HOURS.     allopurinol (ZYLOPRIM) 300 MG tablet Take 300 mg by mouth daily with supper.     aspirin 81 MG EC tablet Take 81 mg by mouth daily with supper.     atorvastatin (LIPITOR) 80 MG tablet Take 80 mg by mouth at bedtime.     cholecalciferol, vitamin D3, 1,000 unit tablet Take 1 tablet (1,000 Units total) by mouth daily.     CONTOUR TEST STRIPS strips USE AS DIRECTED TO TEST TWICE A DAY     fenofibrate (TRICOR) 48 MG tablet Take 48 mg by mouth at bedtime.     furosemide (LASIX) 20 MG tablet Take 20 mg by mouth daily.     furosemide (LASIX) 20 MG tablet Take 20 mg by mouth daily as needed (for wt gain of 2lbs in 24 hours).     glipiZIDE (GLUCOTROL) 5 MG tablet Take 5 mg by mouth daily.     LANCETS MISC Juni Microlet Lancets Miscellaneous  Use As Directed     melatonin 3 mg Tab tablet Take 1 tablet (3 mg total) by mouth bedtime as needed.     mineral oil-hydrophil petrolatum (MINERAL OIL-HYDROPHILIC PETROLATUM) Oint Apply topically 3 (three) times a day as needed.      NOVOLOG 100 unit/mL injection Check blood sugar three (3) times daily.  11.9 Type 2 without complications  Microlet Color Lancets (100s) - dispense 1, refill PRN for 1 year  Flex Pen Needles - BD Ultra-fine Dona Pen Needles - NDC [address]-63 - dispense 1 case, refill PRN for 1 year     sitaGLIPtin (JANUVIA) 25 MG tablet Take 1 tablet (25 mg total) by mouth daily. (Patient taking differently: Take 100 mg by mouth daily. )       .There  were no vitals filed for this visit.  Blood pressure 144/87 pulse 73 respirations 18 temperature 97.7  Physical Exam   Constitutional: She appears well-nourished. No distress.   HENT:   Head: Normocephalic.   Eyes: Pupils are equal, round, and reactive to light.   Neck: Neck supple. No thyromegaly present.   Cardiovascular: Normal rate, regular rhythm and normal heart sounds.    Chronic lower extremity edema.  Nonpitting.   Pulmonary/Chest: Breath sounds normal.   Abdominal: Bowel sounds are normal. There is no tenderness. There is no guarding.   Protuberant.   Musculoskeletal:   Denies pain.  Obese.  Able to use a walker.   Lymphadenopathy:     She has no cervical adenopathy   LABS:   Lab Results   Component Value Date    HGBA1C 5.8 05/15/2017     Lab Results   Component Value Date    WBC 12.5 (H) 05/16/2017    HGB 10.0 (L) 05/16/2017    HCT 31.7 (L) 05/16/2017    MCV 90 05/16/2017     05/20/2017     Lab Results   Component Value Date    CHOL 237 (H) 07/03/2012    CHOL 224 (H) 06/21/2011    CHOL 163 05/26/2010     Lab Results   Component Value Date    HDL 38 (L) 07/03/2012    HDL 49 06/21/2011    HDL 42 05/26/2010     Lab Results   Component Value Date    LDLCALC 155 (H) 07/03/2012    LDLCALC 149 (H) 06/21/2011    LDLCALC 79 05/26/2010     Lab Results   Component Value Date    TRIG 221 (H) 07/03/2012    TRIG 130 06/21/2011    TRIG 209 (H) 05/26/2010     No components found for: CHOLHDL      ASSESSMENT:      ICD-10-CM    1. Type 2 diabetes mellitus with hypoglycemia without coma, without long-term current use of insulin E11.649    2. Cognitive disorder F09    3. Essential hypertension with goal blood pressure less than 140/90 I10    4. Lumbago M54.5        PLAN:    Increasing the Lantus 10 units at at bedtime increasing lisinopril 10 mg.  Would like to eventually get an A1c and lipid panel sometime in the near future otherwise she does plan on going to a new facility she is hoping so within the next couple of  weeks so we will wait and decide on that sometime in the future.  She had no further questions.    .    Electronically signed by: Michael Duane Johnson, CNP

## 2021-06-13 NOTE — PROGRESS NOTES
[facility]    Facility:   [facility] [[id number]]   Code Status: POLST AVAILABLE  PCP: Popeye Raman MD   Phone: [phone]   Fax: [phone]      CHIEF COMPLAINT/REASON FOR VISIT:  Chief Complaint   Patient presents with     Discharge Summary       HISTORY COURSE:  Josee is a 76 y.o. female whowas admitted to [facility] on May 16 and transferred to this facility on May 20, 2017.  She is a patient of the [facility].  She has a medical history significant for type 2 diabetes on glipizide and metformin, hypertension, gout, cognitive disorder, obesity, GERD, CKD 3 with baseline of 1.63 on diuretics and obecity.  She used to live in an apartment and walks around with the use of a cane she has 2 kids ages 41 and 40 with no grandkids.  She presented to her primary care physician with bradycardia.  She fell 2 weeks prior to admission to the hospital.  It is unclear what symptoms brought her in to her primary care physician's office.  At any rate with a bradycardia, his beta-blocker atenolol was stopped and blood work was done.  He was also hypotensive at that time.  Blood work came back abnormal and so she was advised to get into the hospital for further evaluation.      On further questioning, she and her doctor had been working to get her lose some weight and water retention for the last 2 months.  Her weight was 255 pounds in March and she was diuresed quite effectively down to 226 pounds and then to 235 pounds.      It was felt that the diuresis had caused her acute bump in his creatinine which was up to 5.97 on presentation.  She was also noted to be severely hypoglycemic with  Sugar of 21 on presentation.      In the days leading to her presentation, she also noted worsening of her bilateral leg edema.  Further evaluation included a CT scan of the head as well as chest x-ray.  No infectious etiology was found.  CT scan of the head did not reveal any  acute changes.  She was seen by nephrology who recommended to continue holding her ACE inhibitor hydrochlorothiazide and Lasix.  She was placed on IV fluid hydration.  Her oral antidiabetic medications were held metformin and glipizide and this was replaced with Januvia 25mg.  Her creatinine eventually improved from 5.97-4.76 and down to 2.17.  I    At the time of discharge she was safely placed back on furosemide 20 mg once a day.      Her blood pressure has improved she denies any dizziness or syncopal episode no palpitations.  She is urinating a lot she is also drinking a lot  Denies any urinary hesitancy or dysuria or low back pain  Blood sugars are much better however they are trending towards the higher 100s.During her stay at the transitional care unit, her blood sugars continue to go up.  Her Januvia had to be increased to 50 mg and glipizide reinitiated initially at 2.5 then later increased to 5 mg.  Lantus adjusted accordingly blood sugars are still going up upper 100s-200s sometimes even 300s so Lantus will be increased to 14 units in addition to her glipizide and Januvia     Nursing staff has also noted an open area in the buttock a stage II on admission.  This had healed completely.      She worked with physical therapy and did very well.  She did not have any increased dyspnea on exertion.  At the time of discharge from this long-term care unit, she was independent walking with her walker.  She is independent with her ADLs.  She needs minimal assist with dressing.   On the last    Few days before her discharge, she started increasing her weight.  She went from 235 pounds to 239 pounds.  Now hovering in the low 240s.  She was placed back on Lasix 20 mg and to add an additional 20 mg for when her weight goes up by more than 2 pounds in 24 hour period.      Family intends to find her an assisted living apartment and has finally found one at [facility].      Today was the first day I saw her again. SHe  has adjusted well to her surrounding, however would like to stay more independent.  She is anxious to transfer to [facility].  She is also anxious to go back to seeing her primary care physician Dr. Mena who she has seen for a number of years.. Eatign fairly well, taking care not to eat added salt,     Leg edema present, stockings have helped and she is compliant with it  No cp/SOB/abd pain, / diarrhea/constipation is resolved, no urinary issues.     Review of Systems  Unremarkable except for those noted above  There were no vitals filed for this visit.    Physical Exam  Blood sugars in the 200s fasting sometimes they can run up higher to 300s.  Lunchtime in the mid 250s.  Blood pressure 128/ 7/64 weight 240 pounds  2 Patient is alert, awake, oriented to time, place and person, not in acute cardiorespiratory distress  Skin: Warm, and moist,  no lesions,   Head: atraumatic, normocephalic,   Eyes: EOM's intact and conjugate, pink palpebral conjunctivae, anicteric sclerae, pupils reactive  Lungs : Clear to auscultation , no crackles, wheezes or rhonchi  Heart : Nornal first and second heart sounds, No murmurs, heaves, or thrills  Abdomen: Soft, non tender, non distended, no hepatosplenomegaly, no ascites  Extremities: Chronic bipedal edema, pulses are full and equal      MEDICATION LIST:  Current Outpatient Prescriptions   Medication Sig     acetaminophen (TYLENOL) 500 MG tablet Take 500 mg by mouth every 4 (four) hours as needed. Every 4-6 hours as needed. DO NOT EXCEED 4000 MG IN 24 HOURS.     allopurinol (ZYLOPRIM) 300 MG tablet Take 300 mg by mouth daily with supper.     aspirin 81 MG EC tablet Take 81 mg by mouth daily with supper.     atorvastatin (LIPITOR) 80 MG tablet Take 80 mg by mouth at bedtime.     cholecalciferol, vitamin D3, 1,000 unit tablet Take 1 tablet (1,000 Units total) by mouth daily.     CONTOUR TEST STRIPS strips USE AS DIRECTED TO TEST TWICE A DAY     fenofibrate (TRICOR) 48 MG tablet  Take 48 mg by mouth at bedtime.     furosemide (LASIX) 20 MG tablet Take 20 mg by mouth daily.     furosemide (LASIX) 20 MG tablet Take 20 mg by mouth daily as needed (for wt gain of 2lbs in 24 hours).     glipiZIDE (GLUCOTROL) 5 MG tablet Take 5 mg by mouth daily.     insulin glargine (LANTUS) 100 unit/mL injection Inject 14 Units under the skin at bedtime.      LANCETS MIS Juni Microlet Lancets Miscellaneous  Use As Directed     lisinopril (PRINIVIL,ZESTRIL) 10 MG tablet Take 10 mg by mouth daily.     melatonin 3 mg Tab tablet Take 1 tablet (3 mg total) by mouth bedtime as needed.     mineral oil-hydrophil petrolatum (MINERAL OIL-HYDROPHILIC PETROLATUM) Oint Apply topically 3 (three) times a day as needed.      NOVOLOG 100 unit/mL injection Check blood sugar three (3) times daily.  11.9 Type 2 without complications  Microlet Color Lancets (100s) - dispense 1, refill PRN for 1 year  Flex Pen Needles - BD Ultra-fine Dona Pen Needles - NDC [address]-87 - dispense 1 case, refill PRN for 1 year     sitaGLIPtin (JANUVIA) 25 MG tablet Take 1 tablet (25 mg total) by mouth daily. (Patient taking differently: Take 100 mg by mouth daily. )       DISCHARGE DIAGNOSIS:    ICD-10-CM    1. Type 2 diabetes mellitus with hypoglycemia without coma, without long-term current use of insulin E11.649    2. Essential hypertension with goal blood pressure less than 140/90 I10    3. Chronic Kidney Disease, Stage 3 N18.3    4. Gait instability R26.81    5. Morbid obesity, unspecified obesity type E66.01        MEDICAL EQUIPMENT NEEDS:  She has her own walker.    DISCHARGE PLAN/FACE TO FACE:  I certify that services are/were furnished while this patient was under the care of a physician and that a physician or an allowed non-physician practitioner (NPP), had a face-to-face encounter that meets the physician face-to-face encounter requirements. The encounter was in whole, or in part, related to the primary reason for home health. The  patient is confined to his/her home and needs intermittent skilled nursing, physical therapy, speech-language pathology, or the continued need for occupational therapy. A plan of care has been established by a physician and is periodically reviewed by a physician.  Date of Face-to-Face Encounter: 10/12/17     I certify that, based on my findings, the following services are medically necessary home health services: Nursing services for medication management, some assistance/minimal with ADLs meal provision    My clinical findings support the need for the above skilled services because: (Please write a brief narrative summary that describes what the RN, PT, SLP, or other services will be doing in the home. A list of diagnoses in this section does not meet the CMS requirements.) gait imbalance,     This patient is homebound because: (Please write a brief narrative summary describing the functional limitations as to why this patient is homebound and specifically what makes this patient homebound.)  Same as above    The patient is, or has been, under my care and I have initiated the establishment of the plan of care. This patient will be followed by a physician who will periodically review the plan of care.  Total discharge time was more than 35 minutes.  This note has been dictated using voice recognition software. Any grammatical, typographical, or context distortions are unintentional.        Electronically signed by: Popeye Raman MD

## 2021-06-14 NOTE — PROGRESS NOTES
[facility]    Facility:   [facility] [[id number]]   Code Status: POLST AVAILABLE  PCP: Popeye Raman MD   Phone: [phone]   Fax: [phone]      CHIEF COMPLAINT/REASON FOR VISIT:  Chief Complaint   Patient presents with     Discharge Summary       HISTORY COURSE:  Josee is a 76 y.o. female whowas admitted to [facility] on May 16 and transferred to this facility on May 20, 2017.  She is a patient of the [facility].  She has a medical history significant for type 2 diabetes on glipizide and metformin, hypertension, gout, cognitive disorder, obesity, GERD, CKD 3 with baseline of 1.63 on diuretics and obecity.  She used to live in an apartment and walks around with the use of a cane she has 2 kids ages 41 and 40 with no grandkids.  She presented to her primary care physician with bradycardia.  She fell 2 weeks prior to admission to the hospital.  It is unclear what symptoms brought her in to her primary care physician's office.  At any rate with a bradycardia, his beta-blocker atenolol was stopped and blood work was done.  He was also hypotensive at that time.  Blood work came back abnormal and so she was advised to get into the hospital for further evaluation.      On further questioning, she and her doctor had been working to get her lose some weight and water retention for the last 2 months.  Her weight was 255 pounds in March and she was diuresed quite effectively down to 226 pounds and then to 235 pounds.      It was felt that the diuresis had caused her acute bump in his creatinine which was up to 5.97 on presentation.  She was also noted to be severely hypoglycemic with  Sugar of 21 on presentation.      In the days leading to her presentation, she also noted worsening of her bilateral leg edema.  Further evaluation included a CT scan of the head as well as chest x-ray.  No infectious etiology was found.  CT scan of the head did not reveal any  acute changes.  She was seen by nephrology who recommended to continue holding her ACE inhibitor hydrochlorothiazide and Lasix.  She was placed on IV fluid hydration.  Her oral antidiabetic medications were held metformin and glipizide and this was replaced with Januvia 25mg.  Her creatinine eventually improved from 5.97-4.76 and down to 2.17.  I     At the time of discharge she was safely placed back on furosemide 20 mg once a day.      Her blood pressure has improved she denies any dizziness or syncopal episode no palpitations.  She is urinating a lot she is also drinking a lot  Denies any urinary hesitancy or dysuria or low back pain  Blood sugars are much better however they are trending towards the higher 100s.During her stay at the transitional care unit, her blood sugars continue to go up.  Her Januvia had to be increased to 50 mg and glipizide reinitiated initially at 2.5 then later increased to 5 mg.  Lantus adjusted accordingly blood sugars are still going up upper 100s-200s sometimes even 300s so Lantus will be increased to 14 units in addition to her glipizide and Januvia      Nursing staff has also noted an open area in the buttock a stage II on admission.  This had healed completely.      She worked with physical therapy and did very well.  She did not have any increased dyspnea on exertion.  At the time of discharge from this long-term care unit, she was independent walking with her walker.  She is independent with her ADLs.  She needs minimal assist with dressing.   On the last     Few days before her discharge, she started increasing her weight.  She went from 235 pounds to 239 pounds.  Now hovering in the low 240s.  She was placed back on Lasix 20 mg and to add an additional 20 mg for when her weight goes up by more than 2 pounds in 24 hour period.       Family intends to find her an assisted living apartment and has finally found one at [facility].        SHe has adjusted well to her  surrounding, however would like to stay more independent.  She is anxious to transfer to [facility].  She is also anxious to go back to seeing her primary care physician Dr. Mena who she has seen for a number of years.. Eatign fairly well, taking care not to eat added salt,      Leg edema present, stockings have helped and she is compliant with it  No cp/SOB/abd pain, / diarrhea/constipation is resolved, no urinary issues.   Blood sugars continue to be a challenge.  Today we are increasing Lantus further from 14 units to 16 units in addition to her glipizide and Januvia.  There has not been any hypoglycemic incidents.  She is excited to finally move to an assisted living facility.  She walks with a walker.  Enjoys group activities.  She denies any depression.    Denies any trouble with shortness of breath chest pains or palpitations.  Her legs are swollen but they are controlled with her wearing her 10 socks with which she is compliant with.         Review of Systems  Unremarkable  There were no vitals filed for this visit.    Physical Exam  Vital signs noted and stable except for blood sugars which tends to be up in the 100s and 200s still.  Patient is alert, awake, oriented to time, place and person, not in acute cardiorespiratory distress  Skin: Warm, and moist,  no lesions,   Head: atraumatic, normocephalic,   Eyes: EOM's intact and conjugate, pink palpebral conjunctivae, anicteric sclerae, pupils reactive  Lungs : Clear to auscultation , no crackles, wheezes or rhonchi  Heart : Nornal first and second heart sounds, No murmurs, heaves, or thrills  Abdomen: Soft, non tender, non distended, no hepatosplenomegaly, no ascites  Extremities: +2-3 bipedal pitting edema, pulses are full and equal      MEDICATION LIST:  Current Outpatient Prescriptions   Medication Sig     acetaminophen (TYLENOL) 500 MG tablet Take 500 mg by mouth every 4 (four) hours as needed. Every 4-6 hours as needed. DO NOT EXCEED 4000 MG IN 24  HOURS.     allopurinol (ZYLOPRIM) 300 MG tablet Take 300 mg by mouth daily with supper.     aspirin 81 MG EC tablet Take 81 mg by mouth daily with supper.     atorvastatin (LIPITOR) 80 MG tablet Take 80 mg by mouth at bedtime.     cholecalciferol, vitamin D3, 1,000 unit tablet Take 1 tablet (1,000 Units total) by mouth daily.     CONTOUR TEST STRIPS strips USE AS DIRECTED TO TEST TWICE A DAY     fenofibrate (TRICOR) 48 MG tablet Take 48 mg by mouth at bedtime.     furosemide (LASIX) 20 MG tablet Take 20 mg by mouth daily.     furosemide (LASIX) 20 MG tablet Take 20 mg by mouth daily as needed (for wt gain of 2lbs in 24 hours).     glipiZIDE (GLUCOTROL) 5 MG tablet Take 5 mg by mouth daily.     insulin glargine (LANTUS) 100 unit/mL injection Inject 16 Units under the skin at bedtime.      LANCETS MISC [employer] Microlet Lancets Miscellaneous  Use As Directed     lisinopril (PRINIVIL,ZESTRIL) 10 MG tablet Take 10 mg by mouth daily.     melatonin 3 mg Tab tablet Take 1 tablet (3 mg total) by mouth bedtime as needed.     mineral oil-hydrophil petrolatum (MINERAL OIL-HYDROPHILIC PETROLATUM) Oint Apply topically 3 (three) times a day as needed.      NOVOLOG 100 unit/mL injection Check blood sugar three (3) times daily.  11.9 Type 2 without complications  Microlet Color Lancets (100s) - dispense 1, refill PRN for 1 year  Flex Pen Needles - BD Ultra-fine Dona Pen Needles - NDC [address]-99 - dispense 1 case, refill PRN for 1 year     sitaGLIPtin (JANUVIA) 25 MG tablet Take 1 tablet (25 mg total) by mouth daily. (Patient taking differently: Take 100 mg by mouth daily. )       DISCHARGE DIAGNOSIS:    ICD-10-CM    1. Type 2 diabetes mellitus with hypoglycemia without coma, without long-term current use of insulin E11.649    2. Essential hypertension with goal blood pressure less than 140/90 I10    3. Chronic Kidney Disease, Stage 3 N18.3    4. Morbid obesity, unspecified obesity type E66.01    5. Gait instability R26.81         MEDICAL EQUIPMENT NEEDS:  She has her walker.  She will need a nighttime pull-up diapers.  A prescription for this has been written for    DISCHARGE PLAN/FACE TO FACE:  I certify that services are/were furnished while this patient was under the care of a physician and that a physician or an allowed non-physician practitioner (NPP), had a face-to-face encounter that meets the physician face-to-face encounter requirements. The encounter was in whole, or in part, related to the primary reason for home health. The patient is confined to his/her home and needs intermittent skilled nursing, physical therapy, speech-language pathology, or the continued need for occupational therapy. A plan of care has been established by a physician and is periodically reviewed by a physician.  Date of Face-to-Face Encounter: 11/30/17     I certify that, based on my findings, the following services are medically necessary home health services: Nursing services for medication management, some assistance/minimal with ADLs meal provision    My clinical findings support the need for the above skilled services because: (Please write a brief narrative summary that describes what the RN, PT, SLP, or other services will be doing in the home. A list of diagnoses in this section does not meet the CMS requirements.) gait imbalance,     This patient is homebound because: (Please write a brief narrative summary describing the functional limitations as to why this patient is homebound and specifically what makes this patient homebound.)  Same as above    The patient is, or has been, under my care and I have initiated the establishment of the plan of care. This patient will be followed by a physician who will periodically review the plan of care.    She is going to go back to see Dr. Sanderson for her primary care physician going forward.  Total discharge time was more than 35 minutes.  This note has been dictated using voice recognition software. Any  grammatical, typographical, or context distortions are unintentional.           Electronically signed by: Popeye Raman MD

## 2021-06-14 NOTE — PROGRESS NOTES
[facility]    DATE: 2017      NAME:  Josee Simmons             :  1940  MRN: [id number]  CODE STATUS:  FULL CODE    FACILITY:  [facility] [[id number]]       ROOM:   308    CHIEF COMPLAINT/REASON FOR VISIT:  Chief Complaint   Patient presents with     Review Of Multiple Medical Conditions     HISTORY OF PRESENT ILLNESS: Josee Simmons is a 77 y.o. female is being seen today in the [facility] facility for follow up and management of multiple chronic conditions. Her appetite has been good.  She has Diabetes Mellitus and is on Lantus 10 units, Glipizide, and Januvia.  Last Hgb A1c of 5.8%.  She has Hypertension with blood pressures within target range.  Currently on Lisinopril 10 mg coupled with Lasix..  Josee is able to structure her day and establish her own goals. She walks independently with a 4 wheeled walker. Josee is very active and adds to the conversation to the group. She enjoys exercise, word games, bingo, crossword puzzles, special events, table games and news attends activities almost daily.Spends time in her room talking with her room mate. Spends time in day room watching [employer] Right with peers and other TV programs. She does have chronic edema.  Does need assistance with ADLs.  She states that she has a poor appetite but her weights have been stable 243- 247lbs.    Past Medical History:   Diagnosis Date     CKD (chronic kidney disease)      DM type 2 (diabetes mellitus, type 2)      GERD (gastroesophageal reflux disease)      Hypercholesteremia      Hypertension      Obesity      Past Surgical History:   Procedure Laterality Date     EYE SURGERY      bilat     LA EXPLORE PARATHYROID+MEDIASTINUM      Description: Parathyroid Sub-Total Parathyroidect W/ Medias Exploration;  Proc Date: 2010;  Comments: Dr Jose Angel Alonzo     LA REVISE MEDIAN N/CARPAL TUNNEL SURG      Description: Neuroplasty Decompression Median Nerve At Carpal Tunnel;  Recorded:  12/13/2007;     No family history on file.     Social History     Social History     Marital status: Single     Spouse name: N/A     Number of children: N/A     Years of education: N/A     Occupational History     Not on file.     Social History Main Topics     Smoking status: Former Smoker     Smokeless tobacco: Not on file     Alcohol use No     Drug use: No     Sexual activity: Not on file     Other Topics Concern     Not on file     Social History Narrative     Allergies   Allergen Reactions     No Known Drug Allergies      Current Outpatient Prescriptions   Medication Sig Dispense Refill     acetaminophen (TYLENOL) 500 MG tablet Take 500 mg by mouth every 4 (four) hours as needed. Every 4-6 hours as needed. DO NOT EXCEED 4000 MG IN 24 HOURS.       allopurinol (ZYLOPRIM) 300 MG tablet Take 300 mg by mouth daily with supper.       aspirin 81 MG EC tablet Take 81 mg by mouth daily with supper.       atorvastatin (LIPITOR) 80 MG tablet Take 80 mg by mouth at bedtime.       cholecalciferol, vitamin D3, 1,000 unit tablet Take 1 tablet (1,000 Units total) by mouth daily. 90 tablet 2     CONTOUR TEST STRIPS strips USE AS DIRECTED TO TEST TWICE A  strip 6     fenofibrate (TRICOR) 48 MG tablet Take 48 mg by mouth at bedtime.       furosemide (LASIX) 20 MG tablet Take 20 mg by mouth daily.       furosemide (LASIX) 20 MG tablet Take 20 mg by mouth daily as needed (for wt gain of 2lbs in 24 hours).       glipiZIDE (GLUCOTROL) 5 MG tablet Take 5 mg by mouth daily.       insulin glargine (LANTUS) 100 unit/mL injection Inject 14 Units under the skin at bedtime.        LANCETS MIS Juni Microlet Lancets Miscellaneous  Use As Directed       lisinopril (PRINIVIL,ZESTRIL) 10 MG tablet Take 10 mg by mouth daily.       melatonin 3 mg Tab tablet Take 1 tablet (3 mg total) by mouth bedtime as needed. 90 tablet 3     mineral oil-hydrophil petrolatum (MINERAL OIL-HYDROPHILIC PETROLATUM) Oint Apply topically 3 (three) times a day  as needed.        NOVOLOG 100 unit/mL injection Check blood sugar three (3) times daily.  11.9 Type 2 without complications  Microlet Color Lancets (100s) - dispense 1, refill PRN for 1 year  Flex Pen Needles - BD Ultra-fine Dona Pen Needles - NDC [address]-09 - dispense 1 case, refill PRN for 1 year 10 mL PRN     sitaGLIPtin (JANUVIA) 25 MG tablet Take 1 tablet (25 mg total) by mouth daily. (Patient taking differently: Take 100 mg by mouth daily. )  0     No current facility-administered medications for this visit.      REVIEW OF SYSTEMS:    Currently, no fever, chills, or rigors. Does not have any visual or hearing problems. Denies any chest pain, headaches, palpitations, lightheadedness, dizziness, shortness of breath, or cough. Appetite is good. Denies any GERD symptoms. Denies any difficulty with swallowing, nausea, or vomiting.  Denies any abdominal pain, diarrhea or constipation. Denies any urinary symptoms. No insomnia. No active bleeding. No rash.       PHYSICAL EXAMINATION:  Vitals:    11/20/17 2218   BP: 140/88   Pulse: 68   Resp: 20   Temp: 97.7  F (36.5  C)   SpO2: 94%   Weight: (!) 244 lb (110.7 kg)       GENERAL: Awake, Alert, oriented x3, not in any form of acute distress, answers questions appropriately, follows simple commands, conversant  HEENT: Head is normocephalic with normal hair distribution. No evidence of trauma. Ears: No acute purulent discharge. Eyes: Conjunctivae pink with no scleral jaundice. Nose: Normal mucosa and septum. NECK: Supple with no cervical or supraclavicular lymphadenopathy. Trachea is midline.   CHEST: No tenderness or deformity, no crepitus  LUNG: Clear to auscultation with good chest expansion. There are no crackles or wheezes, normal AP diameter.  BACK: No kyphosis of the thoracic spine. Symmetric, no curvature, ROM normal, no CVA tenderness, no spinal tenderness   CVS: There is good S1  S2, there are no murmurs, rubs, gallops, or heaves, rhythm is regular,  2+ pulses  symmetric in all extremities.  ABDOMEN: Globular and soft, nontender to palpation, non distended, no masses, no organomegaly, good bowel sounds, no rebound or guarding, no peritoneal signs.   EXTREMITIES: Atraumatic. Full range of motion on both upper and lower extremities, there is no tenderness to palpation, no pedal edema, no cyanosis or clubbing, no calf tenderness.  Pulses equal in all extremities, normal cap refill, no joint swelling.  SKIN: Warm and dry, no erythema noted.  Skin color, texture, no rashes or lesions.  NEUROLOGICAL: The patient is oriented to person, place and time. Strength and sensation are grossly intact. Face is symmetric.    LABS:    Lab Results   Component Value Date    WBC 12.5 (H) 05/16/2017    HGB 10.0 (L) 05/16/2017    HCT 31.7 (L) 05/16/2017    MCV 90 05/16/2017     05/20/2017     Results for orders placed or performed in visit on 08/25/17   Basic Metabolic Panel   Result Value Ref Range    Sodium 136 136 - 145 mmol/L    Potassium 3.8 3.5 - 5.0 mmol/L    Chloride 99 98 - 107 mmol/L    CO2 28 22 - 31 mmol/L    Anion Gap, Calculation 9 5 - 18 mmol/L    Glucose 239 (H) 70 - 125 mg/dL    Calcium 10.1 8.5 - 10.5 mg/dL    BUN 37 (H) 8 - 28 mg/dL    Creatinine 1.70 (H) 0.60 - 1.10 mg/dL    GFR MDRD Af Amer 36 (L) >60 mL/min/1.73m2    GFR MDRD Non Af Amer 29 (L) >60 mL/min/1.73m2     Lab Results   Component Value Date    HGBA1C 5.8 05/15/2017     Vitamin D, Total (25-Hydroxy)   Date Value Ref Range Status   02/10/2010 30.2 30.0 - 80.0 ng/mL Final     Comment:                    Deficiency              <10.0 ng/mL               Insufficiency       10.0-29.9 ng/mL               Sufficiency         30.0-80.0 ng/mL               Toxicity (possible)    >100.0 ng/mL     Lab Results   Component Value Date    ALT 11 05/15/2017    AST 23 05/15/2017    ALKPHOS 36 (L) 05/15/2017    BILITOT 0.4 05/15/2017       ASSESSMENT/PLAN:    1. Essential hypertension with goal blood pressure less than  140/90 - Blood pressures are within target range, will continue Lasix and Lisinopril   2. Chronic Kidney Disease, Stage 3 - Last BMP BUN 37, Cr 1.70, and GFR 29   3. Type 2 diabetes mellitus with hypoglycemia without coma, without long-term current use of insulin - Last Hgb A1c 5.8%   4. Morbid obesity, unspecified obesity type - Ongoing   5. Hypercholesterolemia  - Stable on Atorvastatin. Last LFTs WNL except for AlkPhos low at 36   6. Lumbago - Denies any discomfort         Electronically signed by:  Kaylen Hernandez CNP    35 minutes TT of which 50% was spent in counseling and coordination of care of the above plan.    Time spent in interview and examination of patient, review of available records, and discussion with nursing staff. Continue care plan, efforts at therapy, and monitor nutritional status.

## 2021-06-15 PROBLEM — N17.9 ACUTE RENAL FAILURE (H): Status: ACTIVE | Noted: 2017-05-16

## 2021-07-13 ENCOUNTER — RECORDS - HEALTHEAST (OUTPATIENT)
Dept: ADMINISTRATIVE | Facility: CLINIC | Age: 81
End: 2021-07-13

## 2021-07-21 ENCOUNTER — RECORDS - HEALTHEAST (OUTPATIENT)
Dept: ADMINISTRATIVE | Facility: CLINIC | Age: 81
End: 2021-07-21

## 2021-07-26 ENCOUNTER — LAB REQUISITION (OUTPATIENT)
Dept: LAB | Facility: CLINIC | Age: 81
End: 2021-07-26
Payer: COMMERCIAL

## 2021-07-26 DIAGNOSIS — I48.0 PAROXYSMAL ATRIAL FIBRILLATION (H): ICD-10-CM

## 2021-07-26 LAB
ANION GAP SERPL CALCULATED.3IONS-SCNC: 14 MMOL/L (ref 5–18)
BUN SERPL-MCNC: 16 MG/DL (ref 8–28)
CALCIUM SERPL-MCNC: 9.9 MG/DL (ref 8.5–10.5)
CHLORIDE BLD-SCNC: 102 MMOL/L (ref 98–107)
CO2 SERPL-SCNC: 24 MMOL/L (ref 22–31)
CREAT SERPL-MCNC: 0.76 MG/DL (ref 0.6–1.1)
ERYTHROCYTE [DISTWIDTH] IN BLOOD BY AUTOMATED COUNT: 14.1 % (ref 10–15)
GFR SERPL CREATININE-BSD FRML MDRD: 75 ML/MIN/1.73M2
GLUCOSE BLD-MCNC: 95 MG/DL (ref 70–125)
HCT VFR BLD AUTO: 41.6 % (ref 35–47)
HGB BLD-MCNC: 12.7 G/DL (ref 11.7–15.7)
LEVETIRACETAM (KEPPRA): 15.8 UG/ML (ref 6–46)
MCH RBC QN AUTO: 28.3 PG (ref 26.5–33)
MCHC RBC AUTO-ENTMCNC: 30.5 G/DL (ref 31.5–36.5)
MCV RBC AUTO: 93 FL (ref 78–100)
PLATELET # BLD AUTO: 310 10E3/UL (ref 150–450)
POTASSIUM BLD-SCNC: 4.2 MMOL/L (ref 3.5–5)
RBC # BLD AUTO: 4.48 10E6/UL (ref 3.8–5.2)
SODIUM SERPL-SCNC: 140 MMOL/L (ref 136–145)
WBC # BLD AUTO: 9.5 10E3/UL (ref 4–11)

## 2021-07-26 PROCEDURE — 80177 DRUG SCRN QUAN LEVETIRACETAM: CPT | Mod: ORL | Performed by: NURSE PRACTITIONER

## 2021-07-26 PROCEDURE — 85027 COMPLETE CBC AUTOMATED: CPT | Mod: ORL | Performed by: NURSE PRACTITIONER

## 2021-07-26 PROCEDURE — 80048 BASIC METABOLIC PNL TOTAL CA: CPT | Mod: ORL | Performed by: NURSE PRACTITIONER

## 2021-07-26 PROCEDURE — P9604 ONE-WAY ALLOW PRORATED TRIP: HCPCS | Mod: ORL | Performed by: NURSE PRACTITIONER

## 2021-07-26 PROCEDURE — 36415 COLL VENOUS BLD VENIPUNCTURE: CPT | Mod: ORL | Performed by: NURSE PRACTITIONER

## 2022-01-24 ENCOUNTER — LAB REQUISITION (OUTPATIENT)
Dept: LAB | Facility: CLINIC | Age: 82
End: 2022-01-24
Payer: COMMERCIAL

## 2022-01-24 DIAGNOSIS — I69.320 APHASIA FOLLOWING CEREBRAL INFARCTION: ICD-10-CM

## 2022-01-24 DIAGNOSIS — A41.9 SEPSIS, UNSPECIFIED ORGANISM (H): ICD-10-CM

## 2022-01-24 DIAGNOSIS — I69.322 DYSARTHRIA FOLLOWING CEREBRAL INFARCTION: ICD-10-CM

## 2022-01-24 DIAGNOSIS — I69.354 HEMIPLEGIA AND HEMIPARESIS FOLLOWING CEREBRAL INFARCTION AFFECTING LEFT NON-DOMINANT SIDE (H): ICD-10-CM

## 2022-01-26 LAB
ALBUMIN SERPL-MCNC: 2.8 G/DL (ref 3.5–5)
ALP SERPL-CCNC: 75 U/L (ref 45–120)
ALT SERPL W P-5'-P-CCNC: 11 U/L (ref 0–45)
ANION GAP SERPL CALCULATED.3IONS-SCNC: 9 MMOL/L (ref 5–18)
AST SERPL W P-5'-P-CCNC: 18 U/L (ref 0–40)
BILIRUB DIRECT SERPL-MCNC: 0.2 MG/DL
BILIRUB SERPL-MCNC: 0.5 MG/DL (ref 0–1)
BUN SERPL-MCNC: 14 MG/DL (ref 8–28)
CALCIUM SERPL-MCNC: 9.8 MG/DL (ref 8.5–10.5)
CHLORIDE BLD-SCNC: 105 MMOL/L (ref 98–107)
CO2 SERPL-SCNC: 26 MMOL/L (ref 22–31)
CREAT SERPL-MCNC: 0.71 MG/DL (ref 0.6–1.1)
ERYTHROCYTE [DISTWIDTH] IN BLOOD BY AUTOMATED COUNT: 15 % (ref 10–15)
GFR SERPL CREATININE-BSD FRML MDRD: 85 ML/MIN/1.73M2
GLUCOSE BLD-MCNC: 95 MG/DL (ref 70–125)
HCT VFR BLD AUTO: 41.2 % (ref 35–47)
HGB BLD-MCNC: 12.8 G/DL (ref 11.7–15.7)
INR PPP: 1.23 (ref 0.85–1.15)
MCH RBC QN AUTO: 27.9 PG (ref 26.5–33)
MCHC RBC AUTO-ENTMCNC: 31.1 G/DL (ref 31.5–36.5)
MCV RBC AUTO: 90 FL (ref 78–100)
PLATELET # BLD AUTO: 361 10E3/UL (ref 150–450)
POTASSIUM BLD-SCNC: 4.2 MMOL/L (ref 3.5–5)
PROT SERPL-MCNC: 6.1 G/DL (ref 6–8)
RBC # BLD AUTO: 4.58 10E6/UL (ref 3.8–5.2)
SODIUM SERPL-SCNC: 140 MMOL/L (ref 136–145)
WBC # BLD AUTO: 8.5 10E3/UL (ref 4–11)

## 2022-01-26 PROCEDURE — 85610 PROTHROMBIN TIME: CPT | Mod: ORL | Performed by: INTERNAL MEDICINE

## 2022-01-26 PROCEDURE — P9604 ONE-WAY ALLOW PRORATED TRIP: HCPCS | Mod: ORL | Performed by: INTERNAL MEDICINE

## 2022-01-26 PROCEDURE — 36415 COLL VENOUS BLD VENIPUNCTURE: CPT | Mod: ORL | Performed by: INTERNAL MEDICINE

## 2022-01-26 PROCEDURE — 85027 COMPLETE CBC AUTOMATED: CPT | Mod: ORL | Performed by: INTERNAL MEDICINE

## 2022-01-26 PROCEDURE — 82248 BILIRUBIN DIRECT: CPT | Mod: ORL | Performed by: INTERNAL MEDICINE

## 2022-01-27 ENCOUNTER — LAB REQUISITION (OUTPATIENT)
Dept: LAB | Facility: CLINIC | Age: 82
End: 2022-01-27
Payer: COMMERCIAL

## 2022-01-27 DIAGNOSIS — R05.9 COUGH, UNSPECIFIED: ICD-10-CM

## 2022-03-12 ENCOUNTER — LAB REQUISITION (OUTPATIENT)
Dept: LAB | Facility: CLINIC | Age: 82
End: 2022-03-12
Payer: COMMERCIAL

## 2022-03-12 DIAGNOSIS — R20.2 PARESTHESIA OF SKIN: ICD-10-CM

## 2022-03-14 ENCOUNTER — LAB REQUISITION (OUTPATIENT)
Dept: LAB | Facility: CLINIC | Age: 82
End: 2022-03-14
Payer: COMMERCIAL

## 2022-03-14 DIAGNOSIS — R20.2 PARESTHESIA OF SKIN: ICD-10-CM

## 2022-03-15 LAB — VIT B12 SERPL-MCNC: 460 PG/ML (ref 213–816)

## 2022-03-15 PROCEDURE — 36415 COLL VENOUS BLD VENIPUNCTURE: CPT | Mod: ORL | Performed by: INTERNAL MEDICINE

## 2022-03-15 PROCEDURE — 82607 VITAMIN B-12: CPT | Mod: ORL | Performed by: INTERNAL MEDICINE

## 2022-03-15 PROCEDURE — P9604 ONE-WAY ALLOW PRORATED TRIP: HCPCS | Mod: ORL | Performed by: INTERNAL MEDICINE

## 2022-07-04 ENCOUNTER — LAB REQUISITION (OUTPATIENT)
Dept: LAB | Facility: CLINIC | Age: 82
End: 2022-07-04
Payer: COMMERCIAL

## 2022-07-04 DIAGNOSIS — I69.354 HEMIPLEGIA AND HEMIPARESIS FOLLOWING CEREBRAL INFARCTION AFFECTING LEFT NON-DOMINANT SIDE (H): ICD-10-CM

## 2022-07-04 DIAGNOSIS — E11.42 TYPE 2 DIABETES MELLITUS WITH DIABETIC POLYNEUROPATHY (H): ICD-10-CM

## 2022-07-04 DIAGNOSIS — I69.320 APHASIA FOLLOWING CEREBRAL INFARCTION: ICD-10-CM

## 2022-07-04 DIAGNOSIS — I69.322 DYSARTHRIA FOLLOWING CEREBRAL INFARCTION: ICD-10-CM

## 2022-07-07 LAB
ANION GAP SERPL CALCULATED.3IONS-SCNC: 14 MMOL/L (ref 7–15)
BUN SERPL-MCNC: 17.3 MG/DL (ref 8–23)
CALCIUM SERPL-MCNC: 10.2 MG/DL (ref 8.8–10.2)
CHLORIDE SERPL-SCNC: 98 MMOL/L (ref 98–107)
CREAT SERPL-MCNC: 0.8 MG/DL (ref 0.51–0.95)
DEPRECATED HCO3 PLAS-SCNC: 25 MMOL/L (ref 22–29)
ERYTHROCYTE [DISTWIDTH] IN BLOOD BY AUTOMATED COUNT: 15.1 % (ref 10–15)
GFR SERPL CREATININE-BSD FRML MDRD: 74 ML/MIN/1.73M2
GLUCOSE SERPL-MCNC: 235 MG/DL (ref 70–99)
HBA1C MFR BLD: 7.2 %
HCT VFR BLD AUTO: 48.3 % (ref 35–47)
HGB BLD-MCNC: 15 G/DL (ref 11.7–15.7)
LEVETIRACETAM SERPL-MCNC: 36.5 ΜG/ML (ref 10–40)
MCH RBC QN AUTO: 27.7 PG (ref 26.5–33)
MCHC RBC AUTO-ENTMCNC: 31.1 G/DL (ref 31.5–36.5)
MCV RBC AUTO: 89 FL (ref 78–100)
PLATELET # BLD AUTO: 326 10E3/UL (ref 150–450)
POTASSIUM SERPL-SCNC: 4.1 MMOL/L (ref 3.4–5.3)
RBC # BLD AUTO: 5.42 10E6/UL (ref 3.8–5.2)
SODIUM SERPL-SCNC: 137 MMOL/L (ref 136–145)
WBC # BLD AUTO: 8.7 10E3/UL (ref 4–11)

## 2022-07-07 PROCEDURE — 83036 HEMOGLOBIN GLYCOSYLATED A1C: CPT | Mod: ORL | Performed by: INTERNAL MEDICINE

## 2022-07-07 PROCEDURE — 80177 DRUG SCRN QUAN LEVETIRACETAM: CPT | Mod: ORL | Performed by: INTERNAL MEDICINE

## 2022-07-07 PROCEDURE — 85027 COMPLETE CBC AUTOMATED: CPT | Mod: ORL | Performed by: INTERNAL MEDICINE

## 2022-07-07 PROCEDURE — 36415 COLL VENOUS BLD VENIPUNCTURE: CPT | Mod: ORL | Performed by: INTERNAL MEDICINE

## 2022-07-07 PROCEDURE — 80048 BASIC METABOLIC PNL TOTAL CA: CPT | Mod: ORL | Performed by: INTERNAL MEDICINE

## 2023-03-06 ENCOUNTER — LAB REQUISITION (OUTPATIENT)
Dept: LAB | Facility: CLINIC | Age: 83
End: 2023-03-06
Payer: COMMERCIAL

## 2023-03-06 DIAGNOSIS — E11.42 TYPE 2 DIABETES MELLITUS WITH DIABETIC POLYNEUROPATHY (H): ICD-10-CM

## 2023-03-08 LAB
ANION GAP SERPL CALCULATED.3IONS-SCNC: 11 MMOL/L (ref 7–15)
BUN SERPL-MCNC: 20.9 MG/DL (ref 8–23)
CALCIUM SERPL-MCNC: 9.5 MG/DL (ref 8.8–10.2)
CHLORIDE SERPL-SCNC: 104 MMOL/L (ref 98–107)
CREAT SERPL-MCNC: 0.79 MG/DL (ref 0.51–0.95)
DEPRECATED HCO3 PLAS-SCNC: 24 MMOL/L (ref 22–29)
ERYTHROCYTE [DISTWIDTH] IN BLOOD BY AUTOMATED COUNT: 15.9 % (ref 10–15)
GFR SERPL CREATININE-BSD FRML MDRD: 75 ML/MIN/1.73M2
GLUCOSE SERPL-MCNC: 125 MG/DL (ref 70–99)
HBA1C MFR BLD: 7.2 %
HCT VFR BLD AUTO: 42.4 % (ref 35–47)
HGB BLD-MCNC: 12.2 G/DL (ref 11.7–15.7)
MCH RBC QN AUTO: 26.3 PG (ref 26.5–33)
MCHC RBC AUTO-ENTMCNC: 28.8 G/DL (ref 31.5–36.5)
MCV RBC AUTO: 92 FL (ref 78–100)
PLATELET # BLD AUTO: 315 10E3/UL (ref 150–450)
POTASSIUM SERPL-SCNC: 4.5 MMOL/L (ref 3.4–5.3)
RBC # BLD AUTO: 4.63 10E6/UL (ref 3.8–5.2)
SODIUM SERPL-SCNC: 139 MMOL/L (ref 136–145)
WBC # BLD AUTO: 8.3 10E3/UL (ref 4–11)

## 2023-03-08 PROCEDURE — 36415 COLL VENOUS BLD VENIPUNCTURE: CPT | Mod: ORL | Performed by: NURSE PRACTITIONER

## 2023-03-08 PROCEDURE — P9604 ONE-WAY ALLOW PRORATED TRIP: HCPCS | Mod: ORL | Performed by: NURSE PRACTITIONER

## 2023-03-08 PROCEDURE — 80048 BASIC METABOLIC PNL TOTAL CA: CPT | Mod: ORL | Performed by: NURSE PRACTITIONER

## 2023-03-08 PROCEDURE — 83036 HEMOGLOBIN GLYCOSYLATED A1C: CPT | Mod: ORL | Performed by: NURSE PRACTITIONER

## 2023-03-08 PROCEDURE — 85027 COMPLETE CBC AUTOMATED: CPT | Mod: ORL | Performed by: NURSE PRACTITIONER

## 2023-06-29 ENCOUNTER — MEDICAL CORRESPONDENCE (OUTPATIENT)
Dept: HEALTH INFORMATION MANAGEMENT | Facility: CLINIC | Age: 83
End: 2023-06-29
Payer: COMMERCIAL

## 2023-06-29 ENCOUNTER — TRANSFERRED RECORDS (OUTPATIENT)
Dept: HEALTH INFORMATION MANAGEMENT | Facility: CLINIC | Age: 83
End: 2023-06-29
Payer: COMMERCIAL

## 2023-07-17 ENCOUNTER — LAB REQUISITION (OUTPATIENT)
Dept: LAB | Facility: CLINIC | Age: 83
End: 2023-07-17
Payer: COMMERCIAL

## 2023-07-17 DIAGNOSIS — E11.42 TYPE 2 DIABETES MELLITUS WITH DIABETIC POLYNEUROPATHY (H): ICD-10-CM

## 2023-07-19 LAB
ANION GAP SERPL CALCULATED.3IONS-SCNC: 11 MMOL/L (ref 7–15)
BUN SERPL-MCNC: 20.8 MG/DL (ref 8–23)
CALCIUM SERPL-MCNC: 9.7 MG/DL (ref 8.8–10.2)
CHLORIDE SERPL-SCNC: 102 MMOL/L (ref 98–107)
CREAT SERPL-MCNC: 0.86 MG/DL (ref 0.51–0.95)
DEPRECATED HCO3 PLAS-SCNC: 26 MMOL/L (ref 22–29)
GFR SERPL CREATININE-BSD FRML MDRD: 67 ML/MIN/1.73M2
GLUCOSE SERPL-MCNC: 111 MG/DL (ref 70–99)
HBA1C MFR BLD: 7 %
HGB BLD-MCNC: 12.5 G/DL (ref 11.7–15.7)
POTASSIUM SERPL-SCNC: 4.4 MMOL/L (ref 3.4–5.3)
SODIUM SERPL-SCNC: 139 MMOL/L (ref 136–145)

## 2023-07-19 PROCEDURE — 36415 COLL VENOUS BLD VENIPUNCTURE: CPT | Mod: ORL | Performed by: INTERNAL MEDICINE

## 2023-07-19 PROCEDURE — P9604 ONE-WAY ALLOW PRORATED TRIP: HCPCS | Mod: ORL | Performed by: INTERNAL MEDICINE

## 2023-07-19 PROCEDURE — 85018 HEMOGLOBIN: CPT | Mod: ORL | Performed by: INTERNAL MEDICINE

## 2023-07-19 PROCEDURE — 83036 HEMOGLOBIN GLYCOSYLATED A1C: CPT | Mod: ORL | Performed by: INTERNAL MEDICINE

## 2023-07-19 PROCEDURE — 80048 BASIC METABOLIC PNL TOTAL CA: CPT | Mod: ORL | Performed by: INTERNAL MEDICINE

## 2023-07-24 ENCOUNTER — LAB REQUISITION (OUTPATIENT)
Dept: LAB | Facility: CLINIC | Age: 83
End: 2023-07-24
Payer: COMMERCIAL

## 2023-07-24 DIAGNOSIS — E83.42 HYPOMAGNESEMIA: ICD-10-CM

## 2023-07-26 LAB — MAGNESIUM SERPL-MCNC: 2 MG/DL (ref 1.7–2.3)

## 2023-07-26 PROCEDURE — 36415 COLL VENOUS BLD VENIPUNCTURE: CPT | Mod: ORL | Performed by: NURSE PRACTITIONER

## 2023-07-26 PROCEDURE — P9604 ONE-WAY ALLOW PRORATED TRIP: HCPCS | Mod: ORL | Performed by: NURSE PRACTITIONER

## 2023-07-26 PROCEDURE — 83735 ASSAY OF MAGNESIUM: CPT | Mod: ORL | Performed by: NURSE PRACTITIONER

## 2023-08-24 ENCOUNTER — MEDICAL CORRESPONDENCE (OUTPATIENT)
Dept: HEALTH INFORMATION MANAGEMENT | Facility: CLINIC | Age: 83
End: 2023-08-24
Payer: COMMERCIAL

## 2023-10-12 ENCOUNTER — LAB REQUISITION (OUTPATIENT)
Dept: LAB | Facility: CLINIC | Age: 83
End: 2023-10-12
Payer: COMMERCIAL

## 2023-10-12 DIAGNOSIS — E83.42 HYPOMAGNESEMIA: ICD-10-CM

## 2023-10-18 LAB — MAGNESIUM SERPL-MCNC: 1.9 MG/DL (ref 1.7–2.3)

## 2023-10-18 PROCEDURE — 36415 COLL VENOUS BLD VENIPUNCTURE: CPT | Mod: ORL | Performed by: INTERNAL MEDICINE

## 2023-10-18 PROCEDURE — 83735 ASSAY OF MAGNESIUM: CPT | Mod: ORL | Performed by: INTERNAL MEDICINE

## 2023-10-18 PROCEDURE — P9604 ONE-WAY ALLOW PRORATED TRIP: HCPCS | Mod: ORL | Performed by: INTERNAL MEDICINE

## 2023-11-17 ENCOUNTER — MEDICAL CORRESPONDENCE (OUTPATIENT)
Dept: HEALTH INFORMATION MANAGEMENT | Facility: CLINIC | Age: 83
End: 2023-11-17
Payer: COMMERCIAL

## 2024-01-22 ENCOUNTER — LAB REQUISITION (OUTPATIENT)
Dept: LAB | Facility: CLINIC | Age: 84
End: 2024-01-22
Payer: COMMERCIAL

## 2024-01-22 DIAGNOSIS — R11.2 NAUSEA WITH VOMITING, UNSPECIFIED: ICD-10-CM

## 2024-01-23 LAB
ANION GAP SERPL CALCULATED.3IONS-SCNC: 14 MMOL/L (ref 7–15)
BUN SERPL-MCNC: 22.3 MG/DL (ref 8–23)
CALCIUM SERPL-MCNC: 8.8 MG/DL (ref 8.8–10.2)
CHLORIDE SERPL-SCNC: 107 MMOL/L (ref 98–107)
CREAT SERPL-MCNC: 0.86 MG/DL (ref 0.51–0.95)
DEPRECATED HCO3 PLAS-SCNC: 20 MMOL/L (ref 22–29)
EGFRCR SERPLBLD CKD-EPI 2021: 67 ML/MIN/1.73M2
ERYTHROCYTE [DISTWIDTH] IN BLOOD BY AUTOMATED COUNT: 16.8 % (ref 10–15)
GLUCOSE SERPL-MCNC: 55 MG/DL (ref 70–99)
HCT VFR BLD AUTO: 40.1 % (ref 35–47)
HGB BLD-MCNC: 11.9 G/DL (ref 11.7–15.7)
MCH RBC QN AUTO: 27 PG (ref 26.5–33)
MCHC RBC AUTO-ENTMCNC: 29.7 G/DL (ref 31.5–36.5)
MCV RBC AUTO: 91 FL (ref 78–100)
PLATELET # BLD AUTO: 357 10E3/UL (ref 150–450)
POTASSIUM SERPL-SCNC: 4.6 MMOL/L (ref 3.4–5.3)
RBC # BLD AUTO: 4.41 10E6/UL (ref 3.8–5.2)
SODIUM SERPL-SCNC: 141 MMOL/L (ref 135–145)
WBC # BLD AUTO: 11 10E3/UL (ref 4–11)

## 2024-01-23 PROCEDURE — 85027 COMPLETE CBC AUTOMATED: CPT | Mod: ORL | Performed by: INTERNAL MEDICINE

## 2024-01-23 PROCEDURE — 36415 COLL VENOUS BLD VENIPUNCTURE: CPT | Mod: ORL | Performed by: INTERNAL MEDICINE

## 2024-01-23 PROCEDURE — 80048 BASIC METABOLIC PNL TOTAL CA: CPT | Mod: ORL | Performed by: INTERNAL MEDICINE

## 2024-01-23 PROCEDURE — P9603 ONE-WAY ALLOW PRORATED MILES: HCPCS | Mod: ORL | Performed by: INTERNAL MEDICINE

## 2024-01-24 ENCOUNTER — MEDICAL CORRESPONDENCE (OUTPATIENT)
Dept: HEALTH INFORMATION MANAGEMENT | Facility: CLINIC | Age: 84
End: 2024-01-24
Payer: COMMERCIAL

## 2024-03-07 ENCOUNTER — MEDICAL CORRESPONDENCE (OUTPATIENT)
Dept: HEALTH INFORMATION MANAGEMENT | Facility: CLINIC | Age: 84
End: 2024-03-07
Payer: COMMERCIAL

## 2024-04-25 ENCOUNTER — MEDICAL CORRESPONDENCE (OUTPATIENT)
Dept: HEALTH INFORMATION MANAGEMENT | Facility: CLINIC | Age: 84
End: 2024-04-25
Payer: COMMERCIAL

## 2024-06-04 ENCOUNTER — LAB REQUISITION (OUTPATIENT)
Dept: LAB | Facility: CLINIC | Age: 84
End: 2024-06-04
Payer: COMMERCIAL

## 2024-06-04 DIAGNOSIS — I48.0 PAROXYSMAL ATRIAL FIBRILLATION (H): ICD-10-CM

## 2024-06-04 DIAGNOSIS — I69.354 HEMIPLEGIA AND HEMIPARESIS FOLLOWING CEREBRAL INFARCTION AFFECTING LEFT NON-DOMINANT SIDE (H): ICD-10-CM

## 2024-06-04 DIAGNOSIS — E66.01 MORBID (SEVERE) OBESITY DUE TO EXCESS CALORIES (H): ICD-10-CM

## 2024-06-04 DIAGNOSIS — I69.320 APHASIA FOLLOWING CEREBRAL INFARCTION: ICD-10-CM

## 2024-06-04 DIAGNOSIS — I69.322 DYSARTHRIA FOLLOWING CEREBRAL INFARCTION: ICD-10-CM

## 2024-06-05 LAB
ANION GAP SERPL CALCULATED.3IONS-SCNC: 10 MMOL/L (ref 7–15)
BUN SERPL-MCNC: 22.2 MG/DL (ref 8–23)
CALCIUM SERPL-MCNC: 9.3 MG/DL (ref 8.8–10.2)
CHLORIDE SERPL-SCNC: 105 MMOL/L (ref 98–107)
CREAT SERPL-MCNC: 0.9 MG/DL (ref 0.51–0.95)
DEPRECATED HCO3 PLAS-SCNC: 25 MMOL/L (ref 22–29)
EGFRCR SERPLBLD CKD-EPI 2021: 64 ML/MIN/1.73M2
ERYTHROCYTE [DISTWIDTH] IN BLOOD BY AUTOMATED COUNT: 16.1 % (ref 10–15)
GLUCOSE SERPL-MCNC: 135 MG/DL (ref 70–99)
HBA1C MFR BLD: 7 %
HCT VFR BLD AUTO: 37.7 % (ref 35–47)
HGB BLD-MCNC: 11.5 G/DL (ref 11.7–15.7)
MCH RBC QN AUTO: 27 PG (ref 26.5–33)
MCHC RBC AUTO-ENTMCNC: 30.5 G/DL (ref 31.5–36.5)
MCV RBC AUTO: 89 FL (ref 78–100)
PLATELET # BLD AUTO: 339 10E3/UL (ref 150–450)
POTASSIUM SERPL-SCNC: 4.3 MMOL/L (ref 3.4–5.3)
RBC # BLD AUTO: 4.26 10E6/UL (ref 3.8–5.2)
SODIUM SERPL-SCNC: 140 MMOL/L (ref 135–145)
WBC # BLD AUTO: 8.9 10E3/UL (ref 4–11)

## 2024-06-05 PROCEDURE — 83036 HEMOGLOBIN GLYCOSYLATED A1C: CPT | Mod: ORL | Performed by: INTERNAL MEDICINE

## 2024-06-05 PROCEDURE — P9604 ONE-WAY ALLOW PRORATED TRIP: HCPCS | Mod: ORL | Performed by: INTERNAL MEDICINE

## 2024-06-05 PROCEDURE — 36415 COLL VENOUS BLD VENIPUNCTURE: CPT | Mod: ORL | Performed by: INTERNAL MEDICINE

## 2024-06-05 PROCEDURE — 80048 BASIC METABOLIC PNL TOTAL CA: CPT | Mod: ORL | Performed by: INTERNAL MEDICINE

## 2024-06-05 PROCEDURE — 85027 COMPLETE CBC AUTOMATED: CPT | Mod: ORL | Performed by: INTERNAL MEDICINE

## 2024-06-13 ENCOUNTER — MEDICAL CORRESPONDENCE (OUTPATIENT)
Dept: HEALTH INFORMATION MANAGEMENT | Facility: CLINIC | Age: 84
End: 2024-06-13
Payer: COMMERCIAL

## 2024-10-29 ENCOUNTER — LAB REQUISITION (OUTPATIENT)
Dept: LAB | Facility: CLINIC | Age: 84
End: 2024-10-29
Payer: COMMERCIAL

## 2024-10-29 DIAGNOSIS — E11.42 TYPE 2 DIABETES MELLITUS WITH DIABETIC POLYNEUROPATHY (H): ICD-10-CM

## 2024-10-30 LAB
EST. AVERAGE GLUCOSE BLD GHB EST-MCNC: 163 MG/DL
HBA1C MFR BLD: 7.3 %

## 2024-10-30 PROCEDURE — P9604 ONE-WAY ALLOW PRORATED TRIP: HCPCS | Mod: ORL | Performed by: INTERNAL MEDICINE

## 2024-10-30 PROCEDURE — 83036 HEMOGLOBIN GLYCOSYLATED A1C: CPT | Mod: ORL | Performed by: INTERNAL MEDICINE

## 2024-10-30 PROCEDURE — 36415 COLL VENOUS BLD VENIPUNCTURE: CPT | Mod: ORL | Performed by: INTERNAL MEDICINE

## 2024-11-13 ENCOUNTER — MEDICAL CORRESPONDENCE (OUTPATIENT)
Dept: HEALTH INFORMATION MANAGEMENT | Facility: CLINIC | Age: 84
End: 2024-11-13
Payer: COMMERCIAL

## 2025-01-16 ENCOUNTER — LAB REQUISITION (OUTPATIENT)
Dept: LAB | Facility: CLINIC | Age: 85
End: 2025-01-16
Payer: COMMERCIAL

## 2025-01-16 DIAGNOSIS — R09.02 HYPOXEMIA: ICD-10-CM

## 2025-01-17 LAB
ANION GAP SERPL CALCULATED.3IONS-SCNC: 11 MMOL/L (ref 7–15)
BUN SERPL-MCNC: 19.8 MG/DL (ref 8–23)
CALCIUM SERPL-MCNC: 9.1 MG/DL (ref 8.8–10.4)
CHLORIDE SERPL-SCNC: 105 MMOL/L (ref 98–107)
CREAT SERPL-MCNC: 0.88 MG/DL (ref 0.51–0.95)
EGFRCR SERPLBLD CKD-EPI 2021: 65 ML/MIN/1.73M2
ERYTHROCYTE [DISTWIDTH] IN BLOOD BY AUTOMATED COUNT: 17.4 % (ref 10–15)
GLUCOSE SERPL-MCNC: 121 MG/DL (ref 70–99)
HCO3 SERPL-SCNC: 24 MMOL/L (ref 22–29)
HCT VFR BLD AUTO: 35.1 % (ref 35–47)
HGB BLD-MCNC: 10.6 G/DL (ref 11.7–15.7)
MCH RBC QN AUTO: 24.8 PG (ref 26.5–33)
MCHC RBC AUTO-ENTMCNC: 30.2 G/DL (ref 31.5–36.5)
MCV RBC AUTO: 82 FL (ref 78–100)
PLATELET # BLD AUTO: 335 10E3/UL (ref 150–450)
POTASSIUM SERPL-SCNC: 4.2 MMOL/L (ref 3.4–5.3)
RBC # BLD AUTO: 4.27 10E6/UL (ref 3.8–5.2)
SODIUM SERPL-SCNC: 140 MMOL/L (ref 135–145)
WBC # BLD AUTO: 9.3 10E3/UL (ref 4–11)

## 2025-01-17 PROCEDURE — 36415 COLL VENOUS BLD VENIPUNCTURE: CPT | Mod: ORL | Performed by: INTERNAL MEDICINE

## 2025-01-17 PROCEDURE — 85027 COMPLETE CBC AUTOMATED: CPT | Mod: ORL | Performed by: INTERNAL MEDICINE

## 2025-01-17 PROCEDURE — P9604 ONE-WAY ALLOW PRORATED TRIP: HCPCS | Mod: ORL | Performed by: INTERNAL MEDICINE

## 2025-01-17 PROCEDURE — 80048 BASIC METABOLIC PNL TOTAL CA: CPT | Mod: ORL | Performed by: INTERNAL MEDICINE

## 2025-02-06 ENCOUNTER — LAB REQUISITION (OUTPATIENT)
Dept: LAB | Facility: CLINIC | Age: 85
End: 2025-02-06
Payer: COMMERCIAL

## 2025-02-06 DIAGNOSIS — N18.9 CHRONIC KIDNEY DISEASE, UNSPECIFIED: ICD-10-CM

## 2025-02-07 LAB
ANION GAP SERPL CALCULATED.3IONS-SCNC: 13 MMOL/L (ref 7–15)
BUN SERPL-MCNC: 19.2 MG/DL (ref 8–23)
CALCIUM SERPL-MCNC: 9.1 MG/DL (ref 8.8–10.4)
CHLORIDE SERPL-SCNC: 106 MMOL/L (ref 98–107)
CREAT SERPL-MCNC: 0.93 MG/DL (ref 0.51–0.95)
EGFRCR SERPLBLD CKD-EPI 2021: 61 ML/MIN/1.73M2
GLUCOSE SERPL-MCNC: 116 MG/DL (ref 70–99)
HCO3 SERPL-SCNC: 23 MMOL/L (ref 22–29)
POTASSIUM SERPL-SCNC: 4.4 MMOL/L (ref 3.4–5.3)
SODIUM SERPL-SCNC: 142 MMOL/L (ref 135–145)

## 2025-02-07 PROCEDURE — 36415 COLL VENOUS BLD VENIPUNCTURE: CPT | Mod: ORL | Performed by: INTERNAL MEDICINE

## 2025-02-07 PROCEDURE — 80048 BASIC METABOLIC PNL TOTAL CA: CPT | Mod: ORL | Performed by: INTERNAL MEDICINE

## 2025-02-07 PROCEDURE — P9604 ONE-WAY ALLOW PRORATED TRIP: HCPCS | Mod: ORL | Performed by: INTERNAL MEDICINE

## 2025-04-02 ENCOUNTER — LAB REQUISITION (OUTPATIENT)
Dept: LAB | Facility: CLINIC | Age: 85
End: 2025-04-02
Payer: COMMERCIAL

## 2025-04-02 DIAGNOSIS — R09.89 OTHER SPECIFIED SYMPTOMS AND SIGNS INVOLVING THE CIRCULATORY AND RESPIRATORY SYSTEMS: ICD-10-CM

## 2025-04-03 ENCOUNTER — LAB REQUISITION (OUTPATIENT)
Dept: LAB | Facility: CLINIC | Age: 85
End: 2025-04-03
Payer: COMMERCIAL

## 2025-04-03 DIAGNOSIS — R09.89 OTHER SPECIFIED SYMPTOMS AND SIGNS INVOLVING THE CIRCULATORY AND RESPIRATORY SYSTEMS: ICD-10-CM

## 2025-04-15 ENCOUNTER — MEDICAL CORRESPONDENCE (OUTPATIENT)
Dept: HEALTH INFORMATION MANAGEMENT | Facility: CLINIC | Age: 85
End: 2025-04-15
Payer: COMMERCIAL

## 2025-04-22 ENCOUNTER — MEDICAL CORRESPONDENCE (OUTPATIENT)
Dept: HEALTH INFORMATION MANAGEMENT | Facility: CLINIC | Age: 85
End: 2025-04-22
Payer: COMMERCIAL

## 2025-06-25 ENCOUNTER — MEDICAL CORRESPONDENCE (OUTPATIENT)
Dept: HEALTH INFORMATION MANAGEMENT | Facility: CLINIC | Age: 85
End: 2025-06-25
Payer: COMMERCIAL

## 2025-07-10 ENCOUNTER — MEDICAL CORRESPONDENCE (OUTPATIENT)
Dept: HEALTH INFORMATION MANAGEMENT | Facility: CLINIC | Age: 85
End: 2025-07-10
Payer: COMMERCIAL

## 2025-07-14 ENCOUNTER — MEDICAL CORRESPONDENCE (OUTPATIENT)
Dept: HEALTH INFORMATION MANAGEMENT | Facility: CLINIC | Age: 85
End: 2025-07-14
Payer: COMMERCIAL